# Patient Record
Sex: FEMALE | Race: WHITE | NOT HISPANIC OR LATINO | Employment: PART TIME | URBAN - METROPOLITAN AREA
[De-identification: names, ages, dates, MRNs, and addresses within clinical notes are randomized per-mention and may not be internally consistent; named-entity substitution may affect disease eponyms.]

---

## 2017-01-19 ENCOUNTER — COMMUNICATION - HEALTHEAST (OUTPATIENT)
Dept: FAMILY MEDICINE | Facility: CLINIC | Age: 32
End: 2017-01-19

## 2017-01-19 DIAGNOSIS — J45.30 MILD PERSISTENT ASTHMA, UNCOMPLICATED: ICD-10-CM

## 2017-02-23 ENCOUNTER — COMMUNICATION - HEALTHEAST (OUTPATIENT)
Dept: FAMILY MEDICINE | Facility: CLINIC | Age: 32
End: 2017-02-23

## 2017-03-06 ENCOUNTER — OFFICE VISIT - HEALTHEAST (OUTPATIENT)
Dept: FAMILY MEDICINE | Facility: CLINIC | Age: 32
End: 2017-03-06

## 2017-03-06 DIAGNOSIS — Z30.9 CONTRACEPTIVE MANAGEMENT: ICD-10-CM

## 2017-03-06 DIAGNOSIS — F79 UNSPECIFIED INTELLECTUAL DISABILITIES: ICD-10-CM

## 2017-03-06 DIAGNOSIS — J45.30 MILD PERSISTENT ASTHMA, UNCOMPLICATED: ICD-10-CM

## 2017-03-06 ASSESSMENT — MIFFLIN-ST. JEOR: SCORE: 1506.47

## 2017-06-08 ENCOUNTER — AMBULATORY - HEALTHEAST (OUTPATIENT)
Dept: NURSING | Facility: CLINIC | Age: 32
End: 2017-06-08

## 2017-09-05 ENCOUNTER — AMBULATORY - HEALTHEAST (OUTPATIENT)
Dept: NURSING | Facility: CLINIC | Age: 32
End: 2017-09-05

## 2017-10-15 ENCOUNTER — COMMUNICATION - HEALTHEAST (OUTPATIENT)
Dept: FAMILY MEDICINE | Facility: CLINIC | Age: 32
End: 2017-10-15

## 2017-10-15 DIAGNOSIS — J45.30 MILD PERSISTENT ASTHMA, UNCOMPLICATED: ICD-10-CM

## 2017-11-17 ENCOUNTER — AMBULATORY - HEALTHEAST (OUTPATIENT)
Dept: FAMILY MEDICINE | Facility: CLINIC | Age: 32
End: 2017-11-17

## 2017-11-17 DIAGNOSIS — Z00.00 HEALTH CARE MAINTENANCE: ICD-10-CM

## 2017-11-17 DIAGNOSIS — Z23 NEED FOR VACCINATION: ICD-10-CM

## 2017-12-05 ENCOUNTER — AMBULATORY - HEALTHEAST (OUTPATIENT)
Dept: NURSING | Facility: CLINIC | Age: 32
End: 2017-12-05

## 2018-02-02 ENCOUNTER — AMBULATORY - HEALTHEAST (OUTPATIENT)
Dept: FAMILY MEDICINE | Facility: CLINIC | Age: 33
End: 2018-02-02

## 2018-02-26 ENCOUNTER — AMBULATORY - HEALTHEAST (OUTPATIENT)
Dept: NURSING | Facility: CLINIC | Age: 33
End: 2018-02-26

## 2018-02-26 DIAGNOSIS — Z30.9 CONTRACEPTIVE MANAGEMENT: ICD-10-CM

## 2018-03-20 ENCOUNTER — COMMUNICATION - HEALTHEAST (OUTPATIENT)
Dept: FAMILY MEDICINE | Facility: CLINIC | Age: 33
End: 2018-03-20

## 2018-03-20 DIAGNOSIS — J45.30 MILD PERSISTENT ASTHMA, UNCOMPLICATED: ICD-10-CM

## 2018-04-12 ENCOUNTER — COMMUNICATION - HEALTHEAST (OUTPATIENT)
Dept: FAMILY MEDICINE | Facility: CLINIC | Age: 33
End: 2018-04-12

## 2018-04-12 DIAGNOSIS — J45.30 MILD PERSISTENT ASTHMA, UNCOMPLICATED: ICD-10-CM

## 2018-04-12 RX ORDER — BUDESONIDE 0.25 MG/2ML
INHALANT ORAL
Qty: 60 ML | Refills: 5 | Status: SHIPPED | OUTPATIENT
Start: 2018-04-12 | End: 2022-08-19

## 2018-05-14 ENCOUNTER — AMBULATORY - HEALTHEAST (OUTPATIENT)
Dept: FAMILY MEDICINE | Facility: CLINIC | Age: 33
End: 2018-05-14

## 2018-05-14 ENCOUNTER — AMBULATORY - HEALTHEAST (OUTPATIENT)
Dept: NURSING | Facility: CLINIC | Age: 33
End: 2018-05-14

## 2018-05-14 DIAGNOSIS — Z30.42 ENCOUNTER FOR SURVEILLANCE OF INJECTABLE CONTRACEPTIVE: ICD-10-CM

## 2018-06-10 ENCOUNTER — COMMUNICATION - HEALTHEAST (OUTPATIENT)
Dept: FAMILY MEDICINE | Facility: CLINIC | Age: 33
End: 2018-06-10

## 2018-06-10 DIAGNOSIS — J45.30 MILD PERSISTENT ASTHMA, UNCOMPLICATED: ICD-10-CM

## 2018-08-06 ENCOUNTER — OFFICE VISIT - HEALTHEAST (OUTPATIENT)
Dept: FAMILY MEDICINE | Facility: CLINIC | Age: 33
End: 2018-08-06

## 2018-08-06 DIAGNOSIS — J45.30 MILD PERSISTENT ASTHMA, UNCOMPLICATED: ICD-10-CM

## 2018-08-06 DIAGNOSIS — Z00.00 ROUTINE GENERAL MEDICAL EXAMINATION AT A HEALTH CARE FACILITY: ICD-10-CM

## 2018-08-06 DIAGNOSIS — Z30.42 ENCOUNTER FOR SURVEILLANCE OF INJECTABLE CONTRACEPTIVE: ICD-10-CM

## 2018-08-06 ASSESSMENT — MIFFLIN-ST. JEOR: SCORE: 1594.24

## 2018-09-08 ENCOUNTER — COMMUNICATION - HEALTHEAST (OUTPATIENT)
Dept: FAMILY MEDICINE | Facility: CLINIC | Age: 33
End: 2018-09-08

## 2018-09-08 DIAGNOSIS — J45.30 MILD PERSISTENT ASTHMA, UNCOMPLICATED: ICD-10-CM

## 2018-10-29 ENCOUNTER — AMBULATORY - HEALTHEAST (OUTPATIENT)
Dept: NURSING | Facility: CLINIC | Age: 33
End: 2018-10-29

## 2018-10-29 DIAGNOSIS — Z30.9 CONTRACEPTIVE MANAGEMENT: ICD-10-CM

## 2019-01-17 ENCOUNTER — COMMUNICATION - HEALTHEAST (OUTPATIENT)
Dept: FAMILY MEDICINE | Facility: CLINIC | Age: 34
End: 2019-01-17

## 2019-01-17 DIAGNOSIS — J45.30 MILD PERSISTENT ASTHMA, UNCOMPLICATED: ICD-10-CM

## 2019-01-21 ENCOUNTER — AMBULATORY - HEALTHEAST (OUTPATIENT)
Dept: NURSING | Facility: CLINIC | Age: 34
End: 2019-01-21

## 2019-04-08 ENCOUNTER — AMBULATORY - HEALTHEAST (OUTPATIENT)
Dept: NURSING | Facility: CLINIC | Age: 34
End: 2019-04-08

## 2019-04-19 ENCOUNTER — COMMUNICATION - HEALTHEAST (OUTPATIENT)
Dept: FAMILY MEDICINE | Facility: CLINIC | Age: 34
End: 2019-04-19

## 2019-04-19 DIAGNOSIS — J45.30 MILD PERSISTENT ASTHMA, UNCOMPLICATED: ICD-10-CM

## 2019-04-22 RX ORDER — ALBUTEROL SULFATE 90 UG/1
AEROSOL, METERED RESPIRATORY (INHALATION)
Qty: 18 G | Refills: 0 | Status: SHIPPED | OUTPATIENT
Start: 2019-04-22 | End: 2021-09-28

## 2019-07-08 ENCOUNTER — AMBULATORY - HEALTHEAST (OUTPATIENT)
Dept: NURSING | Facility: CLINIC | Age: 34
End: 2019-07-08

## 2019-07-08 ENCOUNTER — AMBULATORY - HEALTHEAST (OUTPATIENT)
Dept: FAMILY MEDICINE | Facility: CLINIC | Age: 34
End: 2019-07-08

## 2019-07-08 DIAGNOSIS — Z30.42 ENCOUNTER FOR SURVEILLANCE OF INJECTABLE CONTRACEPTIVE: ICD-10-CM

## 2019-08-20 ENCOUNTER — OFFICE VISIT - HEALTHEAST (OUTPATIENT)
Dept: FAMILY MEDICINE | Facility: CLINIC | Age: 34
End: 2019-08-20

## 2019-08-20 DIAGNOSIS — Z11.3 SCREEN FOR STD (SEXUALLY TRANSMITTED DISEASE): ICD-10-CM

## 2019-08-20 DIAGNOSIS — Z12.4 SCREENING FOR MALIGNANT NEOPLASM OF CERVIX: ICD-10-CM

## 2019-08-20 DIAGNOSIS — J45.30 MILD PERSISTENT ASTHMA, UNCOMPLICATED: ICD-10-CM

## 2019-08-20 DIAGNOSIS — Z00.00 ROUTINE GENERAL MEDICAL EXAMINATION AT A HEALTH CARE FACILITY: ICD-10-CM

## 2019-08-20 DIAGNOSIS — N89.8 VAGINAL ODOR: ICD-10-CM

## 2019-08-20 LAB
CLUE CELLS: NORMAL
TRICHOMONAS, WET PREP: NORMAL
YEAST, WET PREP: NORMAL

## 2019-08-20 ASSESSMENT — MIFFLIN-ST. JEOR: SCORE: 1604.67

## 2019-08-21 LAB
C TRACH DNA SPEC QL PROBE+SIG AMP: NEGATIVE
HPV SOURCE: ABNORMAL
HUMAN PAPILLOMA VIRUS 16 DNA: NEGATIVE
HUMAN PAPILLOMA VIRUS 18 DNA: NEGATIVE
HUMAN PAPILLOMA VIRUS FINAL DIAGNOSIS: ABNORMAL
HUMAN PAPILLOMA VIRUS OTHER HR: POSITIVE
N GONORRHOEA DNA SPEC QL NAA+PROBE: NEGATIVE
SPECIMEN DESCRIPTION: ABNORMAL

## 2019-08-26 ENCOUNTER — OFFICE VISIT - HEALTHEAST (OUTPATIENT)
Dept: MIDWIFE SERVICES | Facility: CLINIC | Age: 34
End: 2019-08-26

## 2019-08-26 DIAGNOSIS — Z30.017 NEXPLANON INSERTION: ICD-10-CM

## 2019-08-26 LAB — HCG UR QL: NEGATIVE

## 2019-08-26 ASSESSMENT — MIFFLIN-ST. JEOR: SCORE: 1618.28

## 2019-08-27 ENCOUNTER — COMMUNICATION - HEALTHEAST (OUTPATIENT)
Dept: FAMILY MEDICINE | Facility: CLINIC | Age: 34
End: 2019-08-27

## 2019-08-28 LAB
BKR LAB AP ABNORMAL BLEEDING: NO
BKR LAB AP BIRTH CONTROL/HORMONES: ABNORMAL
BKR LAB AP CERVICAL APPEARANCE: NORMAL
BKR LAB AP GYN ADEQUACY: ABNORMAL
BKR LAB AP GYN INTERPRETATION: ABNORMAL
BKR LAB AP HPV REFLEX: ABNORMAL
BKR LAB AP LMP: ABNORMAL
BKR LAB AP PATIENT STATUS: ABNORMAL
BKR LAB AP PREVIOUS ABNORMAL: NO
BKR LAB AP PREVIOUS NORMAL: 2016
HIGH RISK?: NO
INTERPRETING LAB: ABNORMAL
PATH REPORT.COMMENTS IMP SPEC: ABNORMAL
RESULT FLAG (HE HISTORICAL CONVERSION): ABNORMAL

## 2019-08-30 ENCOUNTER — COMMUNICATION - HEALTHEAST (OUTPATIENT)
Dept: FAMILY MEDICINE | Facility: CLINIC | Age: 34
End: 2019-08-30

## 2019-09-30 ENCOUNTER — AMBULATORY - HEALTHEAST (OUTPATIENT)
Dept: FAMILY MEDICINE | Facility: CLINIC | Age: 34
End: 2019-09-30

## 2019-09-30 DIAGNOSIS — R87.610 ATYPICAL SQUAMOUS CELLS OF UNDETERMINED SIGNIFICANCE ON CYTOLOGIC SMEAR OF CERVIX (ASC-US): ICD-10-CM

## 2019-09-30 LAB — HCG UR QL: NEGATIVE

## 2019-09-30 ASSESSMENT — MIFFLIN-ST. JEOR: SCORE: 1618.28

## 2019-10-02 LAB
LAB AP CHARGES (HE HISTORICAL CONVERSION): NORMAL
PATH REPORT.COMMENTS IMP SPEC: NORMAL
PATH REPORT.COMMENTS IMP SPEC: NORMAL
PATH REPORT.FINAL DX SPEC: NORMAL
PATH REPORT.GROSS SPEC: NORMAL
PATH REPORT.MICROSCOPIC SPEC OTHER STN: NORMAL
PATH REPORT.RELEVANT HX SPEC: NORMAL
RESULT FLAG (HE HISTORICAL CONVERSION): NORMAL

## 2020-10-01 ENCOUNTER — OFFICE VISIT - HEALTHEAST (OUTPATIENT)
Dept: FAMILY MEDICINE | Facility: CLINIC | Age: 35
End: 2020-10-01

## 2020-10-01 ENCOUNTER — COMMUNICATION - HEALTHEAST (OUTPATIENT)
Dept: FAMILY MEDICINE | Facility: CLINIC | Age: 35
End: 2020-10-01

## 2020-10-01 DIAGNOSIS — Z30.017 ENCOUNTER FOR INITIAL PRESCRIPTION OF IMPLANTABLE SUBDERMAL CONTRACEPTIVE: ICD-10-CM

## 2020-10-01 DIAGNOSIS — J30.2 SEASONAL ALLERGIC RHINITIS, UNSPECIFIED TRIGGER: ICD-10-CM

## 2020-10-01 DIAGNOSIS — R87.610 ATYPICAL SQUAMOUS CELLS OF UNDETERMINED SIGNIFICANCE ON CYTOLOGIC SMEAR OF CERVIX (ASC-US): ICD-10-CM

## 2020-10-01 DIAGNOSIS — Z12.4 SCREENING FOR MALIGNANT NEOPLASM OF CERVIX: ICD-10-CM

## 2020-10-01 DIAGNOSIS — Z00.00 ANNUAL PHYSICAL EXAM: ICD-10-CM

## 2020-10-01 DIAGNOSIS — Z23 NEED FOR INFLUENZA VACCINATION: ICD-10-CM

## 2020-10-01 DIAGNOSIS — J45.30 MILD PERSISTENT ASTHMA, UNCOMPLICATED: ICD-10-CM

## 2020-10-01 DIAGNOSIS — F41.1 ANXIETY STATE: ICD-10-CM

## 2020-10-01 RX ORDER — DIPHENHYDRAMINE HCL 25 MG
25 CAPSULE ORAL EVERY 4 HOURS PRN
Qty: 30 CAPSULE | Refills: 2 | Status: SHIPPED
Start: 2020-10-01 | End: 2021-12-27

## 2020-10-01 RX ORDER — LORATADINE 10 MG/1
10 TABLET ORAL DAILY
Qty: 30 TABLET | Refills: 2 | Status: SHIPPED
Start: 2020-10-01 | End: 2021-12-27

## 2020-10-01 ASSESSMENT — ANXIETY QUESTIONNAIRES
2. NOT BEING ABLE TO STOP OR CONTROL WORRYING: NOT AT ALL
4. TROUBLE RELAXING: MORE THAN HALF THE DAYS
2. NOT BEING ABLE TO STOP OR CONTROL WORRYING: SEVERAL DAYS
1. FEELING NERVOUS, ANXIOUS, OR ON EDGE: SEVERAL DAYS
6. BECOMING EASILY ANNOYED OR IRRITABLE: NOT AT ALL
7. FEELING AFRAID AS IF SOMETHING AWFUL MIGHT HAPPEN: NOT AT ALL
1. FEELING NERVOUS, ANXIOUS, OR ON EDGE: MORE THAN HALF THE DAYS
GAD7 TOTAL SCORE: 6
3. WORRYING TOO MUCH ABOUT DIFFERENT THINGS: SEVERAL DAYS
5. BEING SO RESTLESS THAT IT IS HARD TO SIT STILL: SEVERAL DAYS

## 2020-10-01 ASSESSMENT — MIFFLIN-ST. JEOR: SCORE: 1655.42

## 2020-10-02 LAB
HPV SOURCE: ABNORMAL
HUMAN PAPILLOMA VIRUS 16 DNA: NEGATIVE
HUMAN PAPILLOMA VIRUS 18 DNA: NEGATIVE
HUMAN PAPILLOMA VIRUS FINAL DIAGNOSIS: ABNORMAL
HUMAN PAPILLOMA VIRUS OTHER HR: POSITIVE
SPECIMEN DESCRIPTION: ABNORMAL

## 2020-10-09 LAB

## 2020-10-19 ENCOUNTER — COMMUNICATION - HEALTHEAST (OUTPATIENT)
Dept: FAMILY MEDICINE | Facility: CLINIC | Age: 35
End: 2020-10-19

## 2020-10-19 DIAGNOSIS — R87.610 ASCUS WITH POSITIVE HIGH RISK HPV CERVICAL: ICD-10-CM

## 2020-10-19 DIAGNOSIS — R87.810 ASCUS WITH POSITIVE HIGH RISK HPV CERVICAL: ICD-10-CM

## 2020-10-22 ENCOUNTER — COMMUNICATION - HEALTHEAST (OUTPATIENT)
Dept: FAMILY MEDICINE | Facility: CLINIC | Age: 35
End: 2020-10-22

## 2020-11-12 ENCOUNTER — OFFICE VISIT - HEALTHEAST (OUTPATIENT)
Dept: FAMILY MEDICINE | Facility: CLINIC | Age: 35
End: 2020-11-12

## 2020-11-12 DIAGNOSIS — Z97.5 NEXPLANON IN PLACE: ICD-10-CM

## 2020-11-12 DIAGNOSIS — R87.610 ASCUS WITH POSITIVE HIGH RISK HPV CERVICAL: ICD-10-CM

## 2020-11-12 DIAGNOSIS — R87.810 ASCUS WITH POSITIVE HIGH RISK HPV CERVICAL: ICD-10-CM

## 2020-11-12 DIAGNOSIS — F41.1 ANXIETY STATE: ICD-10-CM

## 2020-11-12 DIAGNOSIS — J45.30 MILD PERSISTENT ASTHMA, UNCOMPLICATED: ICD-10-CM

## 2020-11-12 RX ORDER — FLUOXETINE 10 MG/1
10 CAPSULE ORAL DAILY
Qty: 30 CAPSULE | Refills: 11 | Status: SHIPPED | OUTPATIENT
Start: 2020-11-12 | End: 2021-12-27

## 2020-11-12 RX ORDER — ETONOGESTREL 68 MG/1
IMPLANT SUBCUTANEOUS
Refills: 0 | Status: SHIPPED
Start: 2020-11-12

## 2020-11-12 ASSESSMENT — ANXIETY QUESTIONNAIRES
3. WORRYING TOO MUCH ABOUT DIFFERENT THINGS: SEVERAL DAYS
7. FEELING AFRAID AS IF SOMETHING AWFUL MIGHT HAPPEN: NOT AT ALL
4. TROUBLE RELAXING: SEVERAL DAYS
GAD7 TOTAL SCORE: 3
IF YOU CHECKED OFF ANY PROBLEMS ON THIS QUESTIONNAIRE, HOW DIFFICULT HAVE THESE PROBLEMS MADE IT FOR YOU TO DO YOUR WORK, TAKE CARE OF THINGS AT HOME, OR GET ALONG WITH OTHER PEOPLE: NOT DIFFICULT AT ALL
2. NOT BEING ABLE TO STOP OR CONTROL WORRYING: NOT AT ALL
5. BEING SO RESTLESS THAT IT IS HARD TO SIT STILL: SEVERAL DAYS
6. BECOMING EASILY ANNOYED OR IRRITABLE: NOT AT ALL
1. FEELING NERVOUS, ANXIOUS, OR ON EDGE: NOT AT ALL

## 2021-03-16 ENCOUNTER — AMBULATORY - HEALTHEAST (OUTPATIENT)
Dept: NURSING | Facility: CLINIC | Age: 36
End: 2021-03-16

## 2021-04-06 ENCOUNTER — AMBULATORY - HEALTHEAST (OUTPATIENT)
Dept: NURSING | Facility: CLINIC | Age: 36
End: 2021-04-06

## 2021-05-13 ENCOUNTER — OFFICE VISIT - HEALTHEAST (OUTPATIENT)
Dept: FAMILY MEDICINE | Facility: CLINIC | Age: 36
End: 2021-05-13

## 2021-05-13 DIAGNOSIS — J45.30 MILD PERSISTENT ASTHMA, UNCOMPLICATED: ICD-10-CM

## 2021-05-13 DIAGNOSIS — F41.1 ANXIETY STATE: ICD-10-CM

## 2021-05-13 DIAGNOSIS — Z59.71 INSURANCE COVERAGE PROBLEMS: ICD-10-CM

## 2021-05-13 ASSESSMENT — ANXIETY QUESTIONNAIRES
1. FEELING NERVOUS, ANXIOUS, OR ON EDGE: NOT AT ALL
7. FEELING AFRAID AS IF SOMETHING AWFUL MIGHT HAPPEN: NOT AT ALL
3. WORRYING TOO MUCH ABOUT DIFFERENT THINGS: NOT AT ALL
4. TROUBLE RELAXING: NOT AT ALL
5. BEING SO RESTLESS THAT IT IS HARD TO SIT STILL: NOT AT ALL
GAD7 TOTAL SCORE: 0
2. NOT BEING ABLE TO STOP OR CONTROL WORRYING: NOT AT ALL
6. BECOMING EASILY ANNOYED OR IRRITABLE: NOT AT ALL

## 2021-05-14 ENCOUNTER — COMMUNICATION - HEALTHEAST (OUTPATIENT)
Dept: NURSING | Facility: CLINIC | Age: 36
End: 2021-05-14

## 2021-05-28 ASSESSMENT — ASTHMA QUESTIONNAIRES: ACT_TOTALSCORE: 22

## 2021-05-28 ASSESSMENT — ANXIETY QUESTIONNAIRES
GAD7 TOTAL SCORE: 0
GAD7 TOTAL SCORE: 6
GAD7 TOTAL SCORE: 3

## 2021-05-28 NOTE — TELEPHONE ENCOUNTER
Refill Approved    Rx renewed per Medication Renewal Policy. Medication was last renewed on 1/18/19.    Ernsetina Paige, [facility] Triage/Med Refill 4/22/2019     Requested Prescriptions   Pending Prescriptions Disp Refills     albuterol (PROAIR HFA;PROVENTIL HFA;VENTOLIN HFA) 90 mcg/actuation inhaler [Pharmacy Med Name: ALBUTEROL HFA INH (200 PUFFS) 18GM] 18 g 0     Sig: INHALE 2 PUFFS BY MOUTH FOUR TIMES DAILY AS NEEDED FOR WHEEZING WITH SPACER       Albuterol/Levalbuterol Refill Protocol Passed - 4/19/2019  9:09 AM        Passed - PCP or prescribing provider visit in last year     Last office visit with prescriber/PCP: 3/6/2017 Sharon Zavala CNP OR same dept: Visit date not found OR same specialty: 3/6/2017 Sharon Zavala CNP Last physical: 7/28/2016       Next appt within 3 mo: Visit date not found  Next physical within 3 mo: Visit date not found  Prescriber OR PCP: Sharon Zavala CNP  Last diagnosis associated with med order: 1. Mild persistent asthma, uncomplicated  - albuterol (PROAIR HFA;PROVENTIL HFA;VENTOLIN HFA) 90 mcg/actuation inhaler [Pharmacy Med Name: ALBUTEROL HFA INH (200 PUFFS) 18GM]; INHALE 2 PUFFS BY MOUTH FOUR TIMES DAILY AS NEEDED FOR WHEEZING WITH SPACER  Dispense: 18 g; Refill: 0    If protocol passes may refill for 6 months if within 3 months of last provider visit (or a total of 9 months). If patient requesting >1 inhaler per month refill x 6 months and have patient make appointment with provider.

## 2021-05-30 VITALS — WEIGHT: 173 LBS | HEIGHT: 66 IN | BODY MASS INDEX: 27.8 KG/M2

## 2021-05-31 NOTE — PROGRESS NOTES
Assessment and Plan:     1. Routine general medical examination at a health care facility  Healthy female exam completed today. Discussed lifestyle modifications including weight loss, eating a balanced diet and getting regular cardiovascular exercise. Discussed self breast exams and how to complete these. Pap Smear updated today. Plan yearly annual physicals or sooner as needed.     2. Screening for malignant neoplasm of cervix  Pap smear updated today on patient.  - Gynecologic Cytology (PAP Smear)    3. Vaginal odor  Increased vaginal odor was present.  Wet prep was collected and was negative.  - Wet Prep, Vaginal    4. Screen for STD (sexually transmitted disease)  We will screen for STDs today.  - Chlamydia trachomatis & Neisseria gonorrhoeae, Amplified Detection    5. Mild persistent asthma, uncomplicated  Asthma is well controlled ACT score today is 24.      The patient's current medical problems were reviewed.      The following health maintenance schedule was reviewed with the patient and provided in printed form in the after visit summary:   Health Maintenance   Topic Date Due     HIV SCREENING  07/18/2002     INFLUENZA VACCINE RULE BASED (1) 08/01/2019     ASTHMA CONTROL TEST  08/06/2019     MEDICARE ANNUAL WELLNESS VISIT  08/06/2019     ADVANCE CARE PLANNING  02/01/2021     PAP SMEAR  07/28/2021     TD 18+ HE  12/05/2027     TDAP ADULT ONE TIME DOSE  Completed        Subjective:   Chief Complaint: Sharon Moraes is an 34 y.o. female here for an Annual Wellness visit.   HPI: Overall doing well.  She is due to have a Pap smear this year.  She has been doing well on Depo-Provera but has had a 32 pound weight gain since starting this type of birth control.  She is wondering if there is another type of birth control that she could be on that would help reduce the risk of weight gain or help her lose weight.  She is not currently sexually active but has a new boyfriend.  She reports that she is safe in her  home and feels well.  Her asthma has been under good control.  She has not had any asthma exacerbations.  She continues to work at [employer] 5 days a week and enjoys this job.    Review of Systems: She denies any chest pain, shortness of breath, or difficulty breathing.  Please see above.  The rest of the review of systems are negative for all systems.    Patient Care Team:  Sharon Zavala, CNP as PCP - General (Family Medicine)  Anabelle Santamaria, Rep Irwin as      Patient Active Problem List   Diagnosis     Myopia     Amblyopia     Anxiety     Attention deficit hyperactivity disorder (ADHD)     Borderline Intellectual Functioning     Asthma     Recent Weight Gain (___ Lbs)     Allergy To Dust     Mild persistent asthma, uncomplicated     Contraceptive management     Past Medical History:   Diagnosis Date     ADHD (attention deficit hyperactivity disorder)      Anxiety      Asthma      Borderline intellectual functioning       Past Surgical History:   Procedure Laterality Date     WISDOM TOOTH EXTRACTION        Family History   Problem Relation Age of Onset     Diabetes Mother      Cancer Neg Hx      Stroke Neg Hx       Social History     Socioeconomic History     Marital status: Single     Spouse name: Not on file     Number of children: Not on file     Years of education: Not on file     Highest education level: Not on file   Occupational History     Not on file   Social Needs     Financial resource strain: Not on file     Food insecurity:     Worry: Not on file     Inability: Not on file     Transportation needs:     Medical: Not on file     Non-medical: Not on file   Tobacco Use     Smoking status: Never Smoker     Smokeless tobacco: Never Used   Substance and Sexual Activity     Alcohol use: No     Drug use: No     Sexual activity: Yes     Partners: Male   Lifestyle     Physical activity:     Days per week: Not on file     Minutes per session: Not on file     Stress: Not on file  "  Relationships     Social connections:     Talks on phone: Not on file     Gets together: Not on file     Attends Christian service: Not on file     Active member of club or organization: Not on file     Attends meetings of clubs or organizations: Not on file     Relationship status: Not on file     Intimate partner violence:     Fear of current or ex partner: Not on file     Emotionally abused: Not on file     Physically abused: Not on file     Forced sexual activity: Not on file   Other Topics Concern     Not on file   Social History Narrative     Not on file      Current Outpatient Medications   Medication Sig Dispense Refill     albuterol (PROAIR HFA;PROVENTIL HFA;VENTOLIN HFA) 90 mcg/actuation inhaler INHALE 2 PUFFS BY MOUTH FOUR TIMES DAILY AS NEEDED FOR WHEEZING WITH SPACER 18 g 0     albuterol (PROVENTIL) 2.5 mg /3 mL (0.083 %) nebulizer solution USE 1 VIAL VIA NEBULIZER DAILY(MIX WITH BUDESONIDE) 180 mL 0     budesonide (PULMICORT) 0.25 mg/2 mL nebulizer solution INHALE 1 VIAL VIA NEBULIZER TWICE DAILY. MIX WITH ALBUTEROL 60 mL 5     budesonide (PULMICORT) 90 mcg/actuation inhaler Inhale 1 puff 2 (two) times a day. 1 Inhaler 6     montelukast (SINGULAIR) 10 mg tablet TAKE 1 TABLET(10 MG) BY MOUTH AT BEDTIME 90 tablet 3     No current facility-administered medications for this visit.       Objective:   Vital Signs:   Visit Vitals  /70   Pulse 88   Resp 16   Ht 5' 5.9\" (1.674 m)   Wt 195 lb (88.5 kg)   BMI 31.57 kg/m         VisionScreening:  No exam data present     PHYSICAL EXAM  /70   Pulse 88   Resp 16   Ht 5' 5.9\" (1.674 m)   Wt 195 lb (88.5 kg)   BMI 31.57 kg/m      General Appearance:    Alert, cooperative, no distress, appears stated age   Head:    Normocephalic, without obvious abnormality, atraumatic   Eyes:    PERRL, conjunctiva/corneas clear, EOM's intact, fundi     benign, both eyes   Ears:    Normal TM's and external ear canals, both ears   Nose:   Nares normal, septum midline, " mucosa normal, no drainage     or sinus tenderness   Throat:   Lips, mucosa, and tongue normal; teeth and gums normal   Neck:   Supple, symmetrical, trachea midline, no adenopathy;     thyroid:  no enlargement/tenderness/nodules; no carotid    bruit or JVD   Back:     Symmetric, no curvature, ROM normal, no CVA tenderness   Lungs:     Clear to auscultation bilaterally, respirations unlabored   Chest Wall:    No tenderness or deformity    Heart:    Regular rate and rhythm, S1 and S2 normal, no murmur, rub    or gallop   Breast Exam:    No tenderness, masses, or nipple abnormality   Abdomen:     Soft, non-tender, bowel sounds active all four quadrants,     no masses, no organomegaly   Genitalia:    Normal female without lesion, discharge or tenderness.  Uterus is nontender.  Thin white vaginal discharge is present.  No adnexal mass or tenderness.   Extremities:   Extremities normal, atraumatic, no cyanosis or edema   Pulses:   2+ and symmetric all extremities   Skin:   Skin color, texture, turgor normal, no rashes or lesions   Lymph nodes:   Cervical, supraclavicular, and axillary nodes normal   Neurologic:   CNII-XII intact, normal strength, sensation and reflexes     throughout         Assessment Results 8/20/2019   Activities of Daily Living No help needed   Instrumental Activities of Daily Living No help needed   Mini Cog Total Score 5   Some recent data might be hidden     A Mini-Cog score of 0-2 suggests the possibility of dementia, score of 3-5 suggests no dementia    Identified Health Risks:     She is at risk for lack of exercise and has been provided with information to increase physical activity for the benefit of her well-being.  The patient was counseled and encouraged to consider modifying their diet and eating habits. She was provided with information on recommended healthy diet options.  Information regarding advance directives ([place]), including where she can download the appropriate form, was  provided to the patient via the AVS.

## 2021-05-31 NOTE — TELEPHONE ENCOUNTER
Called patient with pap results. Positive HPV and ASCUS. Recommend Colposcopy. Discussed this with patient and she is comfortable with this plan. I will have our specialty  give her a call to assist with scheduling this.     She works 10:30-3pm and M-F and does not wish to miss work.

## 2021-05-31 NOTE — PROGRESS NOTES
Sharon is a 33 yo G0 here today for a Nexplanon insertion.   Sharon is  Not currently sexually active, last active 1 year ago. Recently had a negative GC/CT screen    Sharon has been on Depo for the last three years and is concerned that it is contributing to her weight gain. Denies changing her diet or activity level. She does not like coming in every 3 months for an injection. She understands that the Nexplanon can cause light unpredictable spotting. She would like to proceed with Nexplanon insertion today.  She is due for her next injection 9/23/19-10/07/19. She understands there will be a few weeks where she will have both Depo and Nexplanon. She is interested in Nexplanon and requests to have it placed today.    Nexplanon Insertion Procedure Note    Pre-operative Diagnosis: desires Nexplanon for contraception    Post-operative Diagnosis: Nexplanon in situ    Indications: contraception    Procedure Details   Urine pregnancy test was done and result was negative.     NEXPLANON has been discussed with healthcare provider who answered all   questions. It was explained that there are benefits as well as risks with using NEXPLANON. The patient understands that there are other birth control methods and that each has its own benefits and risks.  She also understands that she needs to sign a consent form to show  that she is making an  informed and careful decision to use NEXPLANON, and that she has read and understood the following points.    NEXPLANON helps to keep her from getting pregnant.    No contraceptive method is 100% effective, including NEXPLANON.    NEXPLANON has an implant that contains a hormone.    It is important to have the NEXPLANON implant placed in the arm at the right time ofthe menstrual cycle.    After the implant is placed in my arm, the patient should check that it is in place by gently pressing her fingertips over the skin where the implant was placed. She should be able to feel the implant.    The  implant must be removed at the end of three years. The implant can be removed sooner if  she wants it removed.    If she has trouble finding a healthcare provider to remove the implant, she can call [phone] for help.    The implant is placed under the skin of the arm during a procedure done in a healthcare provider s office. There is a slight risk of getting a scar or an infection from this procedure.    Removal is usually a minor procedure. Sometimes, removal may be more difficult. Special procedures, including surgery in the hospital, may be needed. Difficult removals may cause pain and scarring and may result in injury to nerves and blood vessels. If the implant is not removed, its effects may continue.    Most women have changes in their menstrual bleeding patterns while using NEXPLANON.  Bleeding may be irregular, lighter or heavier, or bleeding may completely stop. If the patient thinks she is pregnant, she should contact my healthcare provider as soon as possible.    the patient verbalizes her understanding of  the warning signs for problems with NEXPLANON. She has been advised to seek medical attention if any warning signs appear.    She should tell all her healthcare providers that she is using NEXPLANON.    The patient was advised to have a medical checkup regularly and at any time she is having problems.    NEXPLANON does not provide protection from HIV infection (AIDS) or any other sexually transmitted diseases.  After learning about NEXPLANON, the patient chose to use NEXPLANON.     The risks (including infection, bleeding, pain) and benefits of the procedure were explained to the patient and Written informed consent was obtained.      Pt was positioned on exam table with left, non dominant arm next to her on the table. Insertion site was cleaned with alcohol prep at 7cm from elbow crease. 1%Lidocaine inserted intradermally and Nexplanon was inserted without difficulty. Site was covered with band  aid and a pressure dressing. Pt instructed not to lift heavy items with left arm for 24 hours, Ibuprofen prn for pain or ice to site as need for bruising    Nexplanon Information:  Lot # [id number]  Exp date: 11/25/2021    Condition:  Stable    Complications:  None    Plan:    The patient was advised to call for any fever or for prolonged or severe pain or bleeding. She was advised to use OTC ibuprofen as needed for mild to moderate pain.

## 2021-05-31 NOTE — TELEPHONE ENCOUNTER
There wasn't a time that worked for patient with Dr. Gallardo so I scheduled with Dr Allred for 9/30

## 2021-06-01 VITALS — WEIGHT: 192.7 LBS | BODY MASS INDEX: 30.97 KG/M2 | HEIGHT: 66 IN

## 2021-06-01 NOTE — PROGRESS NOTES
"Colposcopy  Date/Time: 9/30/2019 1:19 PM  Performed by: Silvana Allred MD  Authorized by: Silvana Allred MD   Consent: Verbal consent obtained. Written consent obtained.  Risks and benefits: risks, benefits and alternatives were discussed  Consent given by: patient  Patient understanding: patient states understanding of the procedure being performed  Patient consent: the patient's understanding of the procedure matches consent given  Procedure consent: procedure consent matches procedure scheduled  Relevant documents: relevant documents present and verified  Test results: test results available and properly labeled  Required items: required blood products, implants, devices, and special equipment available  Patient identity confirmed: verbally with patient  Time out: Immediately prior to procedure a \"time out\" was called to verify the correct patient, procedure, equipment, support staff and site/side marked as required.  Comments: After discussion of risks and benefits, negative pregnancy test was reviewed and consent form was signed.  Patient's Pap smear history is as follows:  7/28/2016: Normal Pap  8/20/2019: ASCUS, positive HPV (not 16 or 18)    Patient was prepped and draped in the usual fashion and placed in the dorsal lithotomy position.  Her cervix was well-visualized with a sterile speculum.  A mild amount of blood was wiped away with a plain cotton swab and SCJ was visualized in its entirety.  No abnormalities were seen with direct visualization through the colposcope, no abnormal vasculature became apparent with use of Miller filter.  Upon application of acetic acid, there was mild acetowhite change visualized at 6:00 in relation to the cervical os.  With application of Lugol's solution, this area was mildly negatively staining.  A biopsy was taken of this area.  Patient will be informed of the biopsy results when they return and this will determine future Pap smear frequency.  Patient " tolerated the procedure well and there were no immediate complications.  A small amount of bleeding from the biopsy site was stopped with Monsel solution.    Results for orders placed or performed in visit on 09/30/19  -Pregnancy (Beta-hCG, Qual), Urine       Result                      Value             Ref Range           Pregnancy Test, Urine       Negative          Negative

## 2021-06-03 VITALS
SYSTOLIC BLOOD PRESSURE: 116 MMHG | WEIGHT: 198 LBS | RESPIRATION RATE: 20 BRPM | HEART RATE: 76 BPM | BODY MASS INDEX: 31.82 KG/M2 | DIASTOLIC BLOOD PRESSURE: 74 MMHG | HEIGHT: 66 IN

## 2021-06-03 VITALS — HEIGHT: 66 IN | BODY MASS INDEX: 31.34 KG/M2 | WEIGHT: 195 LBS

## 2021-06-03 VITALS — WEIGHT: 198 LBS | BODY MASS INDEX: 31.82 KG/M2 | HEIGHT: 66 IN

## 2021-06-05 VITALS
HEART RATE: 80 BPM | RESPIRATION RATE: 20 BRPM | SYSTOLIC BLOOD PRESSURE: 110 MMHG | HEIGHT: 66 IN | WEIGHT: 206.19 LBS | BODY MASS INDEX: 33.14 KG/M2 | TEMPERATURE: 98.5 F | DIASTOLIC BLOOD PRESSURE: 74 MMHG

## 2021-06-09 NOTE — PROGRESS NOTES
Assessment/Plan:         1. Contraceptive management  Pregnancy, Urine   2. Borderline Intellectual Functioning     3. Mild persistent asthma, uncomplicated  budesonide (PULMICORT) 90 mcg/actuation inhaler    montelukast (SINGULAIR) 10 mg tablet    albuterol (PROAIR HFA) 90 mcg/actuation inhaler     Patients pregnancy test was negative today. Depo Shot given. She was provided with refills of her asthma medications today as well. As she has had great control of her asthma i switched patient off of budesonide nebulizer to an inhaler. She was encouraged to not take friends medications. She was reminded to use safe sexual practices with barrier protection. She is a vulnerable adult. Overall she appears to be doing well in regards for self care. I discussed with patient to follow up with me in one year or sooner as needed. Otherwise patient to return to the clinic for Depo shot every 3 months.       Subjective:      Sharon Moraes is a 31 y.o. female who presents for contraceptive management. She also needs to complete an asthma control test. She reports that she has not been sexually active. However, she would like to remain on depo-provera for birth control as this has been working well for her. She also reports that she would like refills of her medications for her asthma. Her ACT score is 25 today. She reports that that she ran out of some of her medications so a friend gave her some of their albuterol inhalers. She feels that overall she has been doing very well with her asthma control. She reports that her largest triggers for asthma symptoms continues to be dust.     The following portions of the patient's history were reviewed and updated as appropriate: allergies, current medications and problem list.    Review of Systems   Pertinent items are noted in HPI.      Objective:        Visit Vitals     /60 (Patient Site: Left Arm, Patient Position: Sitting, Cuff Size: Adult Regular)     Pulse 66     Resp 16      "Ht 5' 6\" (1.676 m)     Wt 173 lb (78.5 kg)     BMI 27.92 kg/m2       General appearance: alert, appears stated age and cooperative  Head: Normocephalic, without obvious abnormality, atraumatic  Lungs: clear to auscultation bilaterally  Heart: regular rate and rhythm, S1, S2 normal, no murmur, click, rub or gallop  Neurologic: Grossly normal      Results for orders placed or performed in visit on 03/06/17   Pregnancy, Urine   Result Value Ref Range    Pregnancy Test, Urine Negative Negative    Specific Gravity, UA 1.025 1.001 - 1.030         "

## 2021-06-11 NOTE — PATIENT INSTRUCTIONS - HE
Advance Directive  Patient s advance directive was discussed and I am comfortable with the patient s wishes.  Patient Education   Personalized Prevention Plan  You are due for the preventive services outlined below.  Your care team is available to assist you in scheduling these services.  If you have already completed any of these items, please share that information with your care team to update in your medical record.  Health Maintenance   Topic Date Due     ASTHMA ACTION PLAN  1987     Pneumococcal Vaccine: Pediatrics (0 to 5 Years) and At-Risk Patients (6 to 64 Years) (1 of 1 - PPSV23) 07/18/1993     HIV SCREENING  07/18/2002     INFLUENZA VACCINE RULE BASED (1) 08/01/2020     Asthma Control Test  08/20/2020     MEDICARE ANNUAL WELLNESS VISIT  08/20/2020     ADVANCE CARE PLANNING  02/01/2021     PAP SMEAR  08/20/2024     HPV TEST  08/20/2024     TD 18+ HE  12/05/2027     Pneumococcal Vaccine: 65+ Years (1 of 1 - PPSV23) 07/18/2052     HEPATITIS B VACCINES  Completed     TDAP ADULT ONE TIME DOSE  Completed

## 2021-06-11 NOTE — PROGRESS NOTES
Assessment and Plan:     Annual exam  H/o ASCUS -check Pap with HPV today    Mild persistent asthma  ACT and asthma action plan completed today  Continue budesonide for controller med and albuterol as needed  She is using over-the-counter allergy medications because is cheaper than her Singulair  Ordered a new neb machine to be delivered from [employer].  Her current neb machine is over 11 years old and not working great.  She does not have a card to be able to  a neb machine from a [employer] company.  I gave her some neb tubing to use in the meantime given her current tubing is old and not working.    Anxiety  Mayito 7 was 6 today.  She wanted to start medication so we are starting low-dose fluoxetine and will have her follow-up with the virtual visit in 4 to 8 weeks.  Discussed potential side effects.  She is not interested in counseling because she cannot afford that.    Patient has been advised of split billing requirements and indicates understanding:   Unsure patient understood    The patient's current medical problems were reviewed.      The following health maintenance schedule was reviewed with the patient and provided in printed form in the after visit summary:   Health Maintenance   Topic Date Due     ASTHMA ACTION PLAN  1987     Pneumococcal Vaccine: Pediatrics (0 to 5 Years) and At-Risk Patients (6 to 64 Years) (1 of 1 - PPSV23) 07/18/1993     HIV SCREENING  07/18/2002     INFLUENZA VACCINE RULE BASED (1) 08/01/2020     Asthma Control Test  08/20/2020     MEDICARE ANNUAL WELLNESS VISIT  08/20/2020     ADVANCE CARE PLANNING  02/01/2021     PAP SMEAR  08/20/2024     HPV TEST  08/20/2024     TD 18+ HE  12/05/2027     Pneumococcal Vaccine: 65+ Years (1 of 1 - PPSV23) 07/18/2052     HEPATITIS B VACCINES  Completed     TDAP ADULT ONE TIME DOSE  Completed        Subjective:   Chief Complaint: Sharon Moraes is an 35 y.o. female here for an Annual Wellness visit.   HPI:      pap 8/2019  - ACUS with HPV+, colp 9/30/2019  nexplanon 8/26/2019  Last lipids, glucose, hgb wnl  2016    Mild persistent asthma  ACT 22 and AP completed    Anxiety -   She gets overwhelmed when things get busy at work, she works in a restaurant  H/o anxiety but never took meds or tried therapy b/c cost  PHQ2 = 0   SONALI 7  = 6 = somewhat difficult  Wants to try meds    H/o ADHD - never taking any meds for this    Review of Systems:    Please see above.  The rest of the review of systems are negative for all systems.    Patient Care Team:  Sharon Zavala CNP as PCP - General (Family Medicine)  Rep Maritza Payee as   Sharon Zavala CNP as Assigned PCP     Patient Active Problem List   Diagnosis     Myopia     Amblyopia     Anxiety     Attention deficit hyperactivity disorder (ADHD)     Borderline Intellectual Functioning     Asthma     Recent Weight Gain (___ Lbs)     Allergy To Dust     Mild persistent asthma, uncomplicated     Contraceptive management     Nexplanon insertion     Atypical squamous cells of undetermined significance on cytologic smear of cervix (ASC-US)     Past Medical History:   Diagnosis Date     ADHD (attention deficit hyperactivity disorder)      Anxiety      Asthma      Borderline intellectual functioning      Depression     Anxiety since high school       Past Surgical History:   Procedure Laterality Date     WISDOM TOOTH EXTRACTION        Family History   Problem Relation Age of Onset     Diabetes Mother      Cancer Neg Hx      Stroke Neg Hx       Social History     Socioeconomic History     Marital status: Single     Spouse name: Not on file     Number of children: Not on file     Years of education: Not on file     Highest education level: Not on file   Occupational History     Not on file   Social Needs     Financial resource strain: Not on file     Food insecurity     Worry: Not on file     Inability: Not on file     Transportation needs     Medical: Not on  "file     Non-medical: Not on file   Tobacco Use     Smoking status: Never Smoker     Smokeless tobacco: Never Used   Substance and Sexual Activity     Alcohol use: No     Drug use: No     Sexual activity: Yes     Partners: Male   Lifestyle     Physical activity     Days per week: Not on file     Minutes per session: Not on file     Stress: Not on file   Relationships     Social connections     Talks on phone: Not on file     Gets together: Not on file     Attends Anabaptist service: Not on file     Active member of club or organization: Not on file     Attends meetings of clubs or organizations: Not on file     Relationship status: Not on file     Intimate partner violence     Fear of current or ex partner: Not on file     Emotionally abused: Not on file     Physically abused: Not on file     Forced sexual activity: Not on file   Other Topics Concern     Not on file   Social History Narrative     Not on file      Current Outpatient Medications   Medication Sig Dispense Refill     albuterol (PROAIR HFA;PROVENTIL HFA;VENTOLIN HFA) 90 mcg/actuation inhaler INHALE 2 PUFFS BY MOUTH FOUR TIMES DAILY AS NEEDED FOR WHEEZING WITH SPACER 18 g 0     albuterol (PROVENTIL) 2.5 mg /3 mL (0.083 %) nebulizer solution USE 1 VIAL VIA NEBULIZER DAILY(MIX WITH BUDESONIDE) 180 mL 0     budesonide (PULMICORT) 0.25 mg/2 mL nebulizer solution INHALE 1 VIAL VIA NEBULIZER TWICE DAILY. MIX WITH ALBUTEROL 60 mL 5     budesonide (PULMICORT) 90 mcg/actuation inhaler Inhale 1 puff 2 (two) times a day. 1 Inhaler 6     montelukast (SINGULAIR) 10 mg tablet TAKE 1 TABLET(10 MG) BY MOUTH AT BEDTIME 90 tablet 3     No current facility-administered medications for this visit.       Objective:   Vital Signs:   Visit Vitals  /74 (Patient Site: Left Arm, Patient Position: Sitting, Cuff Size: Adult Regular)   Pulse 80   Temp 98.5  F (36.9  C) (Oral)   Resp 20   Ht 5' 5.9\" (1.674 m)   Wt 206 lb 3 oz (93.5 kg)   LMP 09/26/2020 (Exact Date) "   Breastfeeding No   BMI 33.38 kg/m           PHYSICAL EXAM  OBJECTIVE:  Vitals listed above within normal limits  General appearance: well groomed, pleasant, well hydrated, nontoxic appearing  ENT: PERRL, throat clear  Neck: neck supple, no lymphadenopathy, no thyromegaly  Lungs: lungs clear to auscultation bilaterally, no wheezes or rhonchi  Heart: regular rate and rhythm, no murmurs, rubs or gallops  Abdomen: soft, nontender  Neuro: no focal deficits, CN II-XII grossly intact, alert and oriented  Psych:  mood stable, appears to have good insight and judgment  Pelvic exam:   External genitalia: normal  Vaginal discharge: Thin white discharge (patient reports this is normal when she has her and goes away every month and is not causing any itching or irritation.  So she declined any testing for vaginal discharge)  Cervix: no inflammation, discharge, bleeding or lesions noted.  Bimanual exam: No cervical motion tenderness. No masses  BREAST EXAM: normal without suspicious masses, skin or nipple changes or axillary nodes, self-exam is taught and discussed          Assessment Results 10/1/2020   Activities of Daily Living No help needed   Instrumental Activities of Daily Living No help needed   Mini Cog Total Score 5   Some recent data might be hidden

## 2021-06-12 NOTE — PROGRESS NOTES
"8/20/19 ASCUS, +HR HPV, not 16/18  9/30/19 [facility]- \"Ok\". Plan 1 yr co-test  10/1/20 [facility]IL, +HR HPV, not 16/18. Plan 1 yr co-test   "

## 2021-06-12 NOTE — TELEPHONE ENCOUNTER
Please let patient know that her Pap smear was normal but the HPV was positive.  So we will need to repeat her Pap smear next year.  Also the Pap patient had an incidental finding of vaginal yeast infection.  If she is having any vaginal discharge, odor, itching then I can order prescription for her.  If she is not having any symptoms then we can just monitor for now.  Dr. Liana Moyer  10/22/2020              ----- Message from Vijaya Bates RN sent at 10/21/2020  8:24 AM CDT -----  Pt notified of results through [facility]. There was also an incidental finding on her pap smear consistent with Candida spp. If follow up from this incidental finding is needed, please forward to your nurse team as incidental findings are not handled by the pap team. Thanks!    Vijaya Bates RN BSN, Pap Tracking

## 2021-06-13 NOTE — PROGRESS NOTES
"Sharon Moraes is a 35 y.o. female who is being evaluated via a billable video visit.      The patient has been notified of following:     \"This video visit will be conducted via a call between you and your physician/provider. We have found that certain health care needs can be provided without the need for an in-person physical exam.  This service lets us provide the care you need with a video conversation.  If a prescription is necessary we can send it directly to your pharmacy.  If lab work is needed we can place an order for that and you can then stop by our lab to have the test done at a later time.    Video visits are billed at different rates depending on your insurance coverage. Please reach out to your insurance provider with any questions.    If during the course of the call the physician/provider feels a video visit is not appropriate, you will not be charged for this service.\"    Patient has given verbal consent to a Video visit? Yes  How would you like to obtain your AVS? AVS Preference: [employer].  If dropped by the video visit, the video invitation should be sent to: Text to cell phone: [phone]  Will anyone else be joining your video visit? No        Video Start Time: 510pm    SUS-17-pky[age]-old female with phone visit to follow-up on anxiety    Anxiety  She was seen on October 1 and at that time was having some generalized anxiety.  She was started on fluoxetine and then scheduled to follow-up to see how she is doing.  Her SONALI 7 improved from 6 to 3  She reports feeling much better and her anxiety and stress of improved dramatically.  She denies any side effects from the medication.  She does report that sometimes she gets sleepy but she is taking the medication at night which is helping.  She feels she is better able to manage the stress at her job at [employer]    Positive HPV  She had a positive Pap with ASCUS and high risk HPV not 16 or 18 in 2019  On repeat Pap on October 1, 2020 she had a normal Pap " smear but still had a positive high risk HPV not 18 or 16  She is not had a boyfriend or any sexual encounters for over a year  Is a non-smoker  Discussed repeating her Pap smear and HPV in a year    Birth control  She has Nexplanon that was inserted on 8/26/2019    Mild persistent asthma that is very well controlled    Current Outpatient Medications   Medication Sig     albuterol (PROAIR HFA;PROVENTIL HFA;VENTOLIN HFA) 90 mcg/actuation inhaler INHALE 2 PUFFS BY MOUTH FOUR TIMES DAILY AS NEEDED FOR WHEEZING WITH SPACER     budesonide (PULMICORT) 0.25 mg/2 mL nebulizer solution INHALE 1 VIAL VIA NEBULIZER TWICE DAILY. MIX WITH ALBUTEROL     budesonide (PULMICORT) 90 mcg/actuation inhaler Inhale 1 puff 2 (two) times a day.     diphenhydrAMINE (BENADRYL) 25 mg capsule Take 1 capsule (25 mg total) by mouth every 4 (four) hours as needed for itching.     FLUoxetine (PROZAC) 10 MG capsule Take 1 capsule (10 mg total) by mouth daily.     loratadine (CLARITIN) 10 mg tablet Take 1 tablet (10 mg total) by mouth daily.       A/P  1. Anxiety  Doing well and symptoms have improved after starting fluoxetine  Will continue at current dose  Will recheck SONALI-7 a follow-up appointment in 6 months  - FLUoxetine (PROZAC) 10 MG capsule; Take 1 capsule (10 mg total) by mouth daily.  Dispense: 30 capsule; Refill: 11    2. Nexplanon in place inserted 8/26/2019  Updated on med list      3. Mild persistent asthma, uncomplicated  Doing well with current regimen    4. Human papilloma virus (HPV) DNA test positive   H/o ASCUS with positive high risk HPV cervical in 2019 and then normal pap in 2020   Repeat Pap with HPV in October 2021      Video-Visit Details    Type of service:  Video Visit    Video End Time (time video stopped): 535pm  Originating Location (pt. Location):home    Distant Location (provider location):  [facility]     Platform used for Video Visit: Goldie Moyer  11/12/2020

## 2021-06-15 PROBLEM — Z30.9 CONTRACEPTIVE MANAGEMENT: Status: ACTIVE | Noted: 2017-03-06

## 2021-06-15 PROBLEM — J45.30 MILD PERSISTENT ASTHMA, UNCOMPLICATED: Status: ACTIVE | Noted: 2017-03-06

## 2021-06-17 NOTE — PROGRESS NOTES
Sharon Moraes is a 35 y.o. female who is being evaluated via a billable video visit.      How would you like to obtain your AVS? Mail a copy.  If dropped from the video visit, the video invitation should be resent by: Text to cell phone: [phone]  Will anyone else be joining your video visit? No      Video Start Time: 5:59 PM    Assessment & Plan     Anxiety  Symptoms much improved and will continue on current dose of fluoxetine 10 mg    Mild persistent asthma, uncomplicated  Symptoms controlled with current regimen.  She has a new nebulizer machine and tubing.  She says she has plenty of refills    Insurance coverage problems  She needs some assistance with getting on the proper insurance plan.  - Ambulatory referral to Care Management (Primary Care)      Diagnosis or treatment significantly limited by social determinants of health - Low health literacy and poverty  {Return in about 6 months (around 11/13/2021) for Annual physical and f/u anxiety.    Liana Moyer MD  [facility] [place]    Subjective   Sharon Moraes is 35 y.o. and presents today for the following health issues   HPI     F/u anxiety  GAD7 = 0 today (down from 3 on 11/12/20 and 6 on 10/1/20)  prozac 10mg    Mild persistent asthma  ACT and asthma action plan completed 10/1/20  Continue budesonide for controller med and albuterol as needed    covid vaccine completed    Insurance -   Insurance recently changed and not her meds her cost more  She is no longer getting social security  She works 10 hrs per week.     Review of Systems   Please see above.  The rest of the review of systems are negative for all systems.         Objective       Vitals:  No vitals were obtained today due to virtual visit.    Physical Exam      Video-Visit Details    Type of service:  Video Visit    Video End Time (time video stopped): 615pm  Originating Location (pt. Location): Home    Distant Location (provider location):  [facility]  [place]     Platform used for Video Visit: [employer]

## 2021-06-17 NOTE — PROGRESS NOTES
Clinic Care Coordination Contact  [facility]/Voicemail      Clinical Data: [facility]W Outreach    Outreach attempted x 1 regarding CCC enrollment.  Left message on patient's voicemail with call back information and requested return call.    Plan: [facility]W will attempt another outreach to the patient via phone regarding enrollment into CCC.

## 2021-06-17 NOTE — PROGRESS NOTES
Community Health Worker attempted to reach the patient for a second time and left a message for the patient.  If the patient is returning my call, please transfer the patient to Maryse at ext. [phone].       Patient has been mailed a unreachable letter and was provided with [facility]W contact information if they are interested in accessing Clinic Care Coordination.      Order for Care Management has been closed, no further outreach will be done at this time and patient can be re-referred.

## 2021-06-17 NOTE — PATIENT INSTRUCTIONS - HE
Patient Instructions by Sharon Zavala CNP at 8/20/2019  9:40 AM     Author: Sharon Zavala CNP Service: -- Author Type: Nurse Practitioner    Filed: 8/20/2019 10:03 AM Encounter Date: 8/20/2019 Status: Signed    : Sharon Zavala CNP (Nurse Practitioner)         Patient Education     Exercise for a Healthier Heart  You may wonder how you can improve the health of your heart. If youre thinking about exercise, youre on the right track. You dont need to become an athlete, but you do need a certain amount of brisk exercise to help strengthen your heart. If you have been diagnosed with a heart condition, your doctor may recommend exercise to help stabilize your condition. To help make exercise a habit, choose safe, fun activities.       Be sure to check with your health care provider before starting an exercise program.    Why exercise?  Exercising regularly offers many healthy rewards. It can help you do all of the following:    Improve your blood cholesterol levels to help prevent further heart trouble    Lower your blood pressure to help prevent a stroke or heart attack    Control diabetes, or reduce your risk of getting this disease    Improve your heart and lung function    Reach and maintain a healthy weight    Make your muscles stronger and more limber so you can stay active    Prevent falls and fractures by slowing the loss of bone mass (osteoporosis)    Manage stress better  Exercise tips  Ease into your routine. Set small goals. Then build on them.  Exercise on most days. Aim for a total of 150 or more minutes of moderate to  vigorous intensity activity each week. Consider 40 minutes, 3 to 4 times a week. For best results, activity should last for 40 minutes on average. It is OK to work up to the 40 minute period over time. Examples of moderate-intensity activity is walking one mile in 15 minutes or 30 to 45 minutes of yard work.  Step up your daily activity level. Along with your  exercise program, try being more active throughout the day. Walk instead of drive. Do more household tasks or yard work.  Choose one or more activities you enjoy. Walking is one of the easiest things you can do. You can also try swimming, riding a bike, or taking an exercise class.  Stop exercising and call your doctor if you:    Have chest pain or feel dizzy or lightheaded    Feel burning, tightness, pressure, or heaviness in your chest, neck, shoulders, back, or arms    Have unusual shortness of breath    Have increased joint or muscle pain    Have palpitations or an irregular heartbeat      3065-8110 [employer]. [address] [place] [address]. All rights reserved. This information is not intended as a substitute for professional medical care. Always follow your healthcare professional's instructions.         Patient Education   Understanding [employer] MyPlate  The USDA (US Department of Agriculture) has guidelines to help you make healthy food choices. These are called MyPlate. MyPlate shows the food groups that make up healthy meals using the image of a place setting. Before you eat, think about the healthiest choices for what to put onto your plate or into your cup or bowl. To learn more about building a healthy plate, visit www.choosemyplate.gov.       The Food Groups    Fruits: Any fruit or 100% fruit juice counts as part of the Fruit Group. Fruits may be fresh, canned, frozen, or dried, and may be whole, cut-up, or pureed. Make half your plate fruits and vegetables.    Vegetables: Any vegetable or 100% vegetable juice counts as a member of the Vegetable Group. Vegetables may be fresh, frozen, canned, or dried. They can be served raw or cooked and may be whole, cut-up, or mashed. Make half your plate fruits and vegetables.     Grains: All foods made from grains are part of the Grains Group. These include wheat, rice, oats, cornmeal, and barley such as bread, pasta, oatmeal, cereal,  tortillas, and grits. Grains should be no more than a quarter of your plate. At least half of your grains should be whole grains.    Protein: This group includes meat, poultry, seafood, beans and peas, eggs, processed soy products (like tofu), nuts (including nut butters), and seeds. Make protein choices no more than a quarter of your plate. Meat and poultry choices should be lean or low fat.    Dairy: All fluid milk products and foods made from milk that contain calcium, like yogurt and cheese are part of the Dairy Group. (Foods that have little calcium, such as cream, butter, and cream cheese, are not part of the group.) Most dairy choices should be low-fat or fat-free.    Oils: These are fats that are liquid at room temperature. They include canola, corn, olive, soybean, and sunflower oil. Foods that are mainly oil include mayonnaise, certain salad dressings, and soft margarines. You should have only 5 to 7 teaspoons of oils a day. You probably already get this much from the food you eat.  Use I-Stand to Help Build Your Meals  The [employer] can help you plan and track your meals and activity. You can look up individual foods to see or compare their nutritional value. You can get guidelines for what and how much you should eat. You can compare your food choices. And you can assess personal physical activities and see ways you can improve. Go to www.Big Health.gov/supertracker/.    2685-3958 [employer]. [address] [place] [address], [place] [address]. All rights reserved. This information is not intended as a substitute for professional medical care. Always follow your healthcare professional's instructions.           Patient Education   Understanding Advance Care Planning  Advance care planning is the process of deciding ones own future medical care. It helps ensure that if you cant speak for yourself, your wishes can still be carried out. The plan is a series of legal documents that note a  persons wishes. The documents vary by state. Advance care planning may be done when a person has a serious illness that is expected to get worse. It may be done before major surgery. And it can help you and your family be prepared in case of a major illness or injury. Advance care planning helps with making decisions at these times.       A health care proxy is a person who acts as the voice of a patient when the patient cant speak for himself or herself. The name of this role varies by state. It may be called a Durable Medical Power of  or Durable Power of  for Healthcare. It may be called an agent, surrogate, or advocate. Or it may be called a representative or decision maker. It is an official duty that is identified by a legal document. The document also varies by state.    Why Is Advance Care Planning Important?  If a person communicates their healthcare wishes:    They will be given medical care that matches their values and goals.    Their family members will not be forced to make decisions in a crisis with no guidance.  Creating a Plan  Making an advance care plan is often done in 3 steps:    Thinking about ones wishes. To create an advance care plan, you should think about what kind of medical treatment you would want if you lose the ability to communicate. Are there any situations in which you would refuse or stop treatment? Are there therapies you would want or not want? And whom do you want to make decisions for you? There are many places to learn more about how to plan for your care. Ask your doctor or  for resources.    Picking a health care proxy. This means choosing a trusted person to speak for you only when you cant speak for yourself. When you cannot make medical decisions, your proxy makes sure the instructions in your advance care plan are followed. A proxy does not make decisions based on his or her own opinions. They must put aside those opinions and values if  needed, and carry out your wishes.    Filling out the legal documents. There are several kinds of legal documents for advance care planning. Each one tells health care providers your wishes. The documents may vary by state. They must be signed and may need to be witnessed or notarized. You can cancel or change them whenever you wish. Depending on your state, the documents may include a Healthcare Proxy form, Living Will, Durable Medical Power of , Advance Directive, or others.  The Familys Role  The best help a family can give is to support their loved ones wishes. Open and honest communication is vital. Family should express any concerns they have about the patients choices while the patient can still make decisions.    4666-1896 [employer]. [address] [place] [address], [place] [address]. All rights reserved. This information is not intended as a substitute for professional medical care. Always follow your healthcare professional's instructions.         Also, [facility] offers a free, downloadable health care directive that allows you to share your treatment choices and personal preferences if you cannot communicate your wishes. It also allows you to appoint another person (called a health care agent) to make health care decisions if you are unable to do so. You can download an advance directive by going here: [email or web address]     Patient Education   Personalized Prevention Plan  You are due for the preventive services outlined below.  Your care team is available to assist you in scheduling these services.  If you have already completed any of these items, please share that information with your care team to update in your medical record.  Health Maintenance   Topic Date Due   ? HIV SCREENING  07/18/2002   ? INFLUENZA VACCINE RULE BASED (1) 08/01/2019   ? ASTHMA CONTROL TEST  08/06/2019   ? MEDICARE ANNUAL WELLNESS VISIT  08/06/2019   ? ADVANCE CARE  PLANNING  02/01/2021   ? PAP SMEAR  07/28/2021   ? TD 18+ HE  12/05/2027   ? TDAP ADULT ONE TIME DOSE  Completed

## 2021-06-19 NOTE — PROGRESS NOTES
Assessment and Plan:       1. Routine general medical examination at a health care facility      2. Encounter for surveillance of injectable contraceptive  Counseled patient, discussed possible weight gain with medication.  - medroxyPROGESTERone injection 150 mg (DEPO-PROVERA); Inject 1 mL (150 mg total) into the shoulder, thigh, or buttocks every 3 (three) months.    3. Mild persistent asthma, uncomplicated  See flowsheet for ACT=21 today. Controlled.  Patient did not need refills today-may call in future if symptoms controlled and needs refills.       The patient's current medical problems were reviewed.    The following high BMI interventions were performed this visit: encouragement to exercise and dietary needs education  The following health maintenance schedule was reviewed with the patient and provided in printed form in the after visit summary:   Health Maintenance   Topic Date Due     ASTHMA CONTROL TEST  1985     ASTHMA FOLLOW-UP  02/15/2017     INFLUENZA VACCINE RULE BASED (1) 08/01/2018     ADVANCE DIRECTIVES DISCUSSED WITH PATIENT  02/01/2021     PAP SMEAR  07/28/2021     TD 18+ HE  12/05/2027     TDAP ADULT ONE TIME DOSE  Completed        Subjective:   Chief Complaint: Sharon Moraes is an 33 y.o. female here for an Annual Wellness visit.     HPI:  She is also due for her depo-provera injections.      Does not believe she needs refills of other medications at this time.    Review of Systems:    Please see above.  The rest of the review of systems are negative for all systems.    Patient Care Team:  Sharon Zavala, CNP as PCP - General (Family Medicine)  Anabelle Santamaria, Rep [place] as      Patient Active Problem List   Diagnosis     Myopia     Amblyopia     Anxiety     Attention-deficit Hyperactivity Disorder     Borderline Intellectual Functioning     Asthma     Recent Weight Gain (___ Lbs)     Allergy To Dust     Mild persistent asthma, uncomplicated     Contraceptive  "management     Past Medical History:   Diagnosis Date     ADHD (attention deficit hyperactivity disorder)      Anxiety      Asthma      Borderline intellectual functioning       Past Surgical History:   Procedure Laterality Date     WISDOM TOOTH EXTRACTION        Family History   Problem Relation Age of Onset     Diabetes Mother      Cancer Neg Hx      Stroke Neg Hx       Social History     Social History     Marital status: Single     Spouse name: N/A     Number of children: N/A     Years of education: N/A     Occupational History     Not on file.     Social History Main Topics     Smoking status: Never Smoker     Smokeless tobacco: Never Used     Alcohol use No     Drug use: No     Sexual activity: Yes     Partners: Male     Other Topics Concern     Not on file     Social History Narrative      Current Outpatient Prescriptions   Medication Sig Dispense Refill     albuterol (PROVENTIL) 2.5 mg /3 mL (0.083 %) nebulizer solution USE 1 VIAL VIA NEBULIZER DAILY(MIX WITH BUDESONIDE) 180 mL 0     budesonide (PULMICORT) 0.25 mg/2 mL nebulizer solution INHALE 1 VIAL VIA NEBULIZER TWICE DAILY. MIX WITH ALBUTEROL 60 mL 5     budesonide (PULMICORT) 90 mcg/actuation inhaler Inhale 1 puff 2 (two) times a day. 1 Inhaler 6     montelukast (SINGULAIR) 10 mg tablet TAKE 1 TABLET(10 MG) BY MOUTH AT BEDTIME 90 tablet 0     VENTOLIN HFA 90 mcg/actuation inhaler INHALE 2 PUFFS BY MOUTH FOUR TIMES DAILY AS NEEDED FOR WHEEZING WITH SPACER 18 g 0     No current facility-administered medications for this visit.       Objective:   Vital Signs:   Visit Vitals     /64 (Patient Site: Right Arm, Patient Position: Sitting, Cuff Size: Adult Regular)     Pulse 68     Resp 20     Ht 5' 5.9\" (1.674 m)     Wt 192 lb 11.2 oz (87.4 kg)     Breastfeeding No     BMI 31.2 kg/m2        VisionScreening:  No exam data present     PHYSICAL EXAM  General Appearance: Alert, cooperative, no distress, appears stated age  Head: Normocephalic, without obvious " abnormality, atraumatic  Eyes: PERRL, conjunctiva/corneas clear, EOM's intact  Ears: Normal TM's and external ear canals, both ears  Nose: Nares normal, septum midline,mucosa normal, no drainage  Throat: Lips, mucosa, and tongue normal; teeth and gums normal  Neck: Supple, symmetrical, trachea midline, no adenopathy;  thyroid: not enlarged, symmetric, no tenderness/mass/nodules;   Back: Symmetric, no curvature, ROM normal, no CVA tenderness  Lungs: Clear to auscultation bilaterally, respirations unlabored  Breasts: No breast masses, tenderness, asymmetry, or nipple discharge.  Heart: Regular rate and rhythm, no murmur.   Abdomen: Soft, non-tender, bowel sounds active all four quadrants,  no masses, no organomegaly  Pelvic:Not examined  Extremities: Extremities normal, atraumatic, no cyanosis or edema  Skin: Skin color, texture, turgor normal, no rashes or lesions  Lymph nodes: Cervical, supraclavicular, and axillary nodes normal  Neurologic: Alert.   Psych: Normal affect.  Does not appear anxious or depressed.        Assessment Results 8/6/2018   Activities of Daily Living No help needed   Instrumental Activities of Daily Living No help needed   Mini Cog Total Score 5   Some recent data might be hidden     A Mini-Cog score of 0-2 suggests the possibility of dementia, score of 3-5 suggests no dementia    Identified Health Risks:     She is at risk for lack of exercise and has been provided with information to increase physical activity for the benefit of her well-being.  The patient was counseled and encouraged to consider modifying their diet and eating habits. She was provided with information on recommended healthy diet options.  Information regarding advance directives (living blandon), including where she can download the appropriate form, was provided to the patient via the AVS.

## 2021-06-20 NOTE — LETTER
Letter by Liana Moyer MD at      Author: Liana Moyer MD Service: -- Author Type: --    Filed:  Encounter Date: 10/1/2020 Status: (Other)       My Asthma Action Plan    Name: Sharon Moraes   YOB: 1985  Date: 10/1/2020   My doctor: Liana Moyer MD   My clinic: [facility]        My Control Medicine: Budesonide (Pulmicort) nebulizer solution -  0.25mg/2ml daily  My Rescue Medicine: Albuterol (Proair/Ventolin/Proventil HFA) 2-4 puffs EVERY 4 HOURS as needed. Use a spacer if recommended by your provider.   My Oral Steroid Medicine: needs to see MD My Asthma Severity:   Mild Persistent  Know your asthma triggers: cold air               GREEN ZONE   Good Control    I feel good    No cough or wheeze    Can work, sleep and play without asthma symptoms     Take your asthma control medicine every day.     1. If exercise triggers your asthma, take your rescue medication    15 minutes before exercise or sports, and    During exercise if you have asthma symptoms  2. Spacer to use with inhaler: If you have a spacer, make sure to use it with your inhaler             YELLOW ZONE Getting Worse  I have ANY of these:    I do not feel good    Cough or wheeze    Chest feels tight    Wake up at night 1. Keep taking your Green Zone medications  2. Start taking your rescue medicine:    every 20 minutes for up to 1 hour. Then every 4 hours for 24-48 hours.  3. If you stay in the Yellow Zone for more than 12-24 hours, contact your doctor.  4. If you do not return to the Green Zone in 12-24 hours or you get worse, start taking your oral steroid medicine if prescribed by your provider.           RED ZONE Medical Alert - Get Help  I have ANY of these:    I feel awful    Medicine is not helping    Breathing getting harder    Trouble walking or talking    Nose opens wide to breathe     1. Take your rescue medicine NOW  2. If your provider has prescribed an oral steroid medicine, start  taking it NOW  3. Call your doctor NOW  4. If you are still in the Red Zone after 20 minutes and you have not reached your doctor:    Take your rescue medicine again and    Call 911 or go to the emergency room right away    See your regular doctor within 2 weeks of an Emergency Room or Urgent Care visit for follow-up treatment.          Annual Reminders:  Meet with Asthma Educator,  Flu Shot in the Fall, consider Pneumonia Vaccination for patients with asthma (aged 19 and older).    Pharmacy:   [id number] [place] [address] [address] [place] [address] [address] [place] [address]  Phone: [phone] Fax: [phone]      Electronically signed by Liana Moyer MD   Date: 10/01/20                    Asthma Triggers  How To Control Things That Make Your Asthma Worse    Triggers are things that make your asthma worse.  Look at the list below to help you find your triggers and what you can do about them.  You can help prevent asthma flare-ups by staying away from your triggers.      Trigger                                                          What you can do   Cigarette Smoke  Tobacco smoke can make asthma worse. Do not allow smoking in your home, car or around you.  Be sure no one smokes at a sukhi day care or school.  If you smoke, ask your health care provider for ways to help you quit.  Ask family members to quit too.  Ask your health care provider for a referral to Quit Plan to help you quit smoking, or call 9-803-056-PLAN.     Colds, Flu, Bronchitis  These are common triggers of asthma. Wash your hands often.  Dont touch your eyes, nose or mouth.  Get a flu shot every year.     Dust Mites  These are tiny bugs that live in cloth or carpet. They are too small to see. Wash sheets and blankets in hot water every week.   Encase pillows and mattress in dust mite proof covers.  Avoid having carpet if you can. If you have carpet, vacuum weekly.   Use a dust  mask and HEPA vacuum.   Pollen and Outdoor Mold  Some people are allergic to trees, grass, or weed pollen, or molds. Try to keep your windows closed.  Limit time out doors when pollen count is high.   Ask you health care provider about taking medicine during allergy season.     Animal Dander  Some people are allergic to skin flakes, urine or saliva from pets with fur or feathers. Keep pets with fur or feathers out of your home.    If you cant keep the pet outdoors, then keep the pet out of your bedroom.  Keep the bedroom door closed.  Keep pets off cloth furniture and away from stuffed toys.     Mice, Rats, and Cockroaches   Some people are allergic to the waste from these pests.   Cover food and garbage.  Clean up spills and food crumbs.  Store grease in the refrigerator.   Keep food out of the bedroom.   Indoor Mold  This can be a trigger if your home has high moisture. Fix leaking faucets, pipes, or other sources of water.   Clean moldy surfaces.  Dehumidify basement if it is damp and smelly.   Smoke, Strong Odors, and Sprays  These can reduce air quality. Stay away from strong odors and sprays, such as perfume, powder, hair spray, paints, smoke incense, paint, cleaning products, candles and new carpet.   Exercise or Sports  Some people with asthma have this trigger. Be active!  Ask your doctor about taking medicine before sports or exercise to prevent symptoms.    Warm up for 5-10 minutes before and after sports or exercise.     Other Triggers of Asthma  Cold air:  Cover your nose and mouth with a scarf.  Sometimes laughing or crying can be a trigger.  Some medicines and food can trigger asthma.

## 2021-06-21 NOTE — LETTER
Letter by Maryse Willoughby CHW at      Author: Maryse Willoughby CHW Service: -- Author Type: --    Filed:  Encounter Date: 5/14/2021 Status: (Other)       CARE COORDINATION  [address], [address]  [place] [address]    May 17, 2021    Sharon Moraes  [address]  [facility] [address]      Dear Sharon,    I am a clinic community health worker who works with Liana Moyer MD at [facility]. I have been trying to reach you recently to introduce Clinic Care Coordination and to see if there was anything I could assist you with.  Below is a description of clinic care coordination and how I can further assist you.      The clinic care coordination team is made up of a registered nurse,  and community health worker who understand the health care system. The goal of clinic care coordination is to help you manage your health and improve access to the health care system in the most efficient manner. The team can assist you in meeting your health care goals by providing education, coordinating services, strengthening the communication among your providers and supporting you with any resource needs.    Please feel free to contact me at [phone] with any questions or concerns. We are focused on providing you with the highest-quality healthcare experience possible and that all starts with you.     Sincerely,     Maryse Willoughby  Community Health Worker

## 2021-06-23 NOTE — TELEPHONE ENCOUNTER
Refill Approved    Rx renewed per Medication Renewal Policy. Medication was last renewed on 3/20/18.    Ernestina Paige, Care Connection Triage/Med Refill 1/18/2019     Requested Prescriptions   Pending Prescriptions Disp Refills     VENTOLIN HFA 90 mcg/actuation inhaler [Pharmacy Med Name: VENTOLIN HFA INH W/DOS CTR 200PUFFS] 18 g 0     Sig: INHALE 2 PUFFS BY MOUTH FOUR TIMES DAILY AS NEEDED FOR WHEEZING WITH SPACER    Albuterol/Levalbuterol Refill Protocol Passed - 1/17/2019  9:24 AM       Passed - PCP or prescribing provider visit in last year    Last office visit with prescriber/PCP: 3/6/2017 Sharon Zavala CNP OR same dept: Visit date not found OR same specialty: 3/6/2017 Sharon Zavala CNP Last physical: 7/28/2016       Next appt within 3 mo: Visit date not found  Next physical within 3 mo: Visit date not found  Prescriber OR PCP: Sharon Zavala CNP  Last diagnosis associated with med order: 1. Mild persistent asthma, uncomplicated  - VENTOLIN HFA 90 mcg/actuation inhaler [Pharmacy Med Name: VENTOLIN HFA INH W/DOS CTR 200PUFFS]; INHALE 2 PUFFS BY MOUTH FOUR TIMES DAILY AS NEEDED FOR WHEEZING WITH SPACER  Dispense: 18 g; Refill: 0    If protocol passes may refill for 6 months if within 3 months of last provider visit (or a total of 9 months). If patient requesting >1 inhaler per month refill x 6 months and have patient make appointment with provider.

## 2021-07-14 PROBLEM — Z30.017 NEXPLANON INSERTION: Status: RESOLVED | Noted: 2019-08-26 | Resolved: 2020-11-12

## 2021-08-21 ENCOUNTER — HEALTH MAINTENANCE LETTER (OUTPATIENT)
Age: 36
End: 2021-08-21

## 2021-09-28 ENCOUNTER — OFFICE VISIT (OUTPATIENT)
Dept: FAMILY MEDICINE | Facility: CLINIC | Age: 36
End: 2021-09-28
Payer: MEDICARE

## 2021-09-28 VITALS
WEIGHT: 205.38 LBS | OXYGEN SATURATION: 97 % | TEMPERATURE: 98.7 F | DIASTOLIC BLOOD PRESSURE: 68 MMHG | BODY MASS INDEX: 33.01 KG/M2 | HEART RATE: 82 BPM | HEIGHT: 66 IN | SYSTOLIC BLOOD PRESSURE: 100 MMHG | RESPIRATION RATE: 16 BRPM

## 2021-09-28 DIAGNOSIS — N89.8 VAGINAL DISCHARGE: ICD-10-CM

## 2021-09-28 DIAGNOSIS — R87.810 ASCUS WITH POSITIVE HIGH RISK HPV CERVICAL: ICD-10-CM

## 2021-09-28 DIAGNOSIS — E55.9 VITAMIN D DEFICIENCY: ICD-10-CM

## 2021-09-28 DIAGNOSIS — F41.1 ANXIETY STATE: ICD-10-CM

## 2021-09-28 DIAGNOSIS — Z00.00 ANNUAL PHYSICAL EXAM: Primary | ICD-10-CM

## 2021-09-28 DIAGNOSIS — Z11.4 ENCOUNTER FOR SCREENING FOR HIV: ICD-10-CM

## 2021-09-28 DIAGNOSIS — Z11.59 ENCOUNTER FOR HEPATITIS C SCREENING TEST FOR LOW RISK PATIENT: ICD-10-CM

## 2021-09-28 DIAGNOSIS — J45.30 MILD PERSISTENT ASTHMA, UNCOMPLICATED: ICD-10-CM

## 2021-09-28 DIAGNOSIS — R87.610 ASCUS WITH POSITIVE HIGH RISK HPV CERVICAL: ICD-10-CM

## 2021-09-28 LAB
ALBUMIN SERPL-MCNC: 4.2 G/DL (ref 3.5–5)
ALP SERPL-CCNC: 77 U/L (ref 45–120)
ALT SERPL W P-5'-P-CCNC: 23 U/L (ref 0–45)
ANION GAP SERPL CALCULATED.3IONS-SCNC: 11 MMOL/L (ref 5–18)
AST SERPL W P-5'-P-CCNC: 17 U/L (ref 0–40)
BASOPHILS # BLD AUTO: 0 10E3/UL (ref 0–0.2)
BASOPHILS NFR BLD AUTO: 0 %
BILIRUB SERPL-MCNC: 0.4 MG/DL (ref 0–1)
BUN SERPL-MCNC: 10 MG/DL (ref 8–22)
CALCIUM SERPL-MCNC: 9.4 MG/DL (ref 8.5–10.5)
CHLORIDE BLD-SCNC: 108 MMOL/L (ref 98–107)
CHOLEST SERPL-MCNC: 213 MG/DL
CLUE CELLS: ABNORMAL
CO2 SERPL-SCNC: 21 MMOL/L (ref 22–31)
CREAT SERPL-MCNC: 0.65 MG/DL (ref 0.6–1.1)
EOSINOPHIL # BLD AUTO: 0.3 10E3/UL (ref 0–0.7)
EOSINOPHIL NFR BLD AUTO: 4 %
ERYTHROCYTE [DISTWIDTH] IN BLOOD BY AUTOMATED COUNT: 12.9 % (ref 10–15)
FASTING STATUS PATIENT QL REPORTED: YES
GFR SERPL CREATININE-BSD FRML MDRD: >90 ML/MIN/1.73M2
GLUCOSE BLD-MCNC: 93 MG/DL (ref 70–125)
HCT VFR BLD AUTO: 41.5 % (ref 35–47)
HDLC SERPL-MCNC: 54 MG/DL
HGB BLD-MCNC: 14.2 G/DL (ref 11.7–15.7)
IMM GRANULOCYTES # BLD: 0.1 10E3/UL
IMM GRANULOCYTES NFR BLD: 1 %
LDLC SERPL CALC-MCNC: 146 MG/DL
LYMPHOCYTES # BLD AUTO: 2.1 10E3/UL (ref 0.8–5.3)
LYMPHOCYTES NFR BLD AUTO: 27 %
MCH RBC QN AUTO: 29.3 PG (ref 26.5–33)
MCHC RBC AUTO-ENTMCNC: 34.2 G/DL (ref 31.5–36.5)
MCV RBC AUTO: 86 FL (ref 78–100)
MONOCYTES # BLD AUTO: 0.5 10E3/UL (ref 0–1.3)
MONOCYTES NFR BLD AUTO: 7 %
NEUTROPHILS # BLD AUTO: 4.8 10E3/UL (ref 1.6–8.3)
NEUTROPHILS NFR BLD AUTO: 61 %
PLATELET # BLD AUTO: 436 10E3/UL (ref 150–450)
POTASSIUM BLD-SCNC: 4.3 MMOL/L (ref 3.5–5)
PROT SERPL-MCNC: 7.2 G/DL (ref 6–8)
RBC # BLD AUTO: 4.85 10E6/UL (ref 3.8–5.2)
SODIUM SERPL-SCNC: 140 MMOL/L (ref 136–145)
TRICHOMONAS, WET PREP: ABNORMAL
TRIGL SERPL-MCNC: 66 MG/DL
WBC # BLD AUTO: 7.8 10E3/UL (ref 4–11)
WBC'S/HIGH POWER FIELD, WET PREP: ABNORMAL
YEAST, WET PREP: PRESENT

## 2021-09-28 PROCEDURE — 87591 N.GONORRHOEAE DNA AMP PROB: CPT | Performed by: FAMILY MEDICINE

## 2021-09-28 PROCEDURE — 82306 VITAMIN D 25 HYDROXY: CPT | Performed by: FAMILY MEDICINE

## 2021-09-28 PROCEDURE — 80061 LIPID PANEL: CPT | Performed by: FAMILY MEDICINE

## 2021-09-28 PROCEDURE — 85025 COMPLETE CBC W/AUTO DIFF WBC: CPT | Performed by: FAMILY MEDICINE

## 2021-09-28 PROCEDURE — G0123 SCREEN CERV/VAG THIN LAYER: HCPCS | Performed by: FAMILY MEDICINE

## 2021-09-28 PROCEDURE — 87389 HIV-1 AG W/HIV-1&-2 AB AG IA: CPT | Performed by: FAMILY MEDICINE

## 2021-09-28 PROCEDURE — 99395 PREV VISIT EST AGE 18-39: CPT | Performed by: FAMILY MEDICINE

## 2021-09-28 PROCEDURE — 87491 CHLMYD TRACH DNA AMP PROBE: CPT | Performed by: FAMILY MEDICINE

## 2021-09-28 PROCEDURE — 87624 HPV HI-RISK TYP POOLED RSLT: CPT | Performed by: FAMILY MEDICINE

## 2021-09-28 PROCEDURE — 87210 SMEAR WET MOUNT SALINE/INK: CPT | Performed by: FAMILY MEDICINE

## 2021-09-28 PROCEDURE — 36415 COLL VENOUS BLD VENIPUNCTURE: CPT | Performed by: FAMILY MEDICINE

## 2021-09-28 PROCEDURE — 80053 COMPREHEN METABOLIC PANEL: CPT | Performed by: FAMILY MEDICINE

## 2021-09-28 PROCEDURE — 86803 HEPATITIS C AB TEST: CPT | Performed by: FAMILY MEDICINE

## 2021-09-28 RX ORDER — ALBUTEROL SULFATE 90 UG/1
AEROSOL, METERED RESPIRATORY (INHALATION)
Qty: 18 G | Refills: 0 | Status: SHIPPED | OUTPATIENT
Start: 2021-09-28 | End: 2024-08-15

## 2021-09-28 ASSESSMENT — PATIENT HEALTH QUESTIONNAIRE - PHQ9
SUM OF ALL RESPONSES TO PHQ QUESTIONS 1-9: 1
10. IF YOU CHECKED OFF ANY PROBLEMS, HOW DIFFICULT HAVE THESE PROBLEMS MADE IT FOR YOU TO DO YOUR WORK, TAKE CARE OF THINGS AT HOME, OR GET ALONG WITH OTHER PEOPLE: NOT DIFFICULT AT ALL
SUM OF ALL RESPONSES TO PHQ QUESTIONS 1-9: 1

## 2021-09-28 ASSESSMENT — ASTHMA QUESTIONNAIRES
ACT_TOTALSCORE: 22
QUESTION_4 LAST FOUR WEEKS HOW OFTEN HAVE YOU USED YOUR RESCUE INHALER OR NEBULIZER MEDICATION (SUCH AS ALBUTEROL): ONCE A WEEK OR LESS
QUESTION_5 LAST FOUR WEEKS HOW WOULD YOU RATE YOUR ASTHMA CONTROL: WELL CONTROLLED
QUESTION_3 LAST FOUR WEEKS HOW OFTEN DID YOUR ASTHMA SYMPTOMS (WHEEZING, COUGHING, SHORTNESS OF BREATH, CHEST TIGHTNESS OR PAIN) WAKE YOU UP AT NIGHT OR EARLIER THAN USUAL IN THE MORNING: ONCE OR TWICE
QUESTION_2 LAST FOUR WEEKS HOW OFTEN HAVE YOU HAD SHORTNESS OF BREATH: NOT AT ALL
QUESTION_1 LAST FOUR WEEKS HOW MUCH OF THE TIME DID YOUR ASTHMA KEEP YOU FROM GETTING AS MUCH DONE AT WORK, SCHOOL OR AT HOME: NONE OF THE TIME

## 2021-09-28 ASSESSMENT — ACTIVITIES OF DAILY LIVING (ADL): CURRENT_FUNCTION: NO ASSISTANCE NEEDED

## 2021-09-28 ASSESSMENT — MIFFLIN-ST. JEOR: SCORE: 1648.32

## 2021-09-28 NOTE — PATIENT INSTRUCTIONS
My Asthma Action Plan    Name: Sharon Moraes   YOB: 1985  Date: 9/28/2021   My doctor: Liana Moyer MD   My clinic: [facility]        My Control Medicine: Budesonide (Pulmicort) nebulizer solution -  0.5mg/2ml bid prn  My Rescue Medicine: Albuterol (Proair/Ventolin/Proventil HFA) 2-4 puffs EVERY 4 HOURS as needed. Use a spacer if recommended by your provider.  My Oral Steroid Medicine: see MD My Asthma Severity:   Mild Persistent  Know your asthma triggers: smoke, upper respiratory infections, dust mites, strong odors and fumes and cold air               GREEN ZONE   Good Control    I feel good    No cough or wheeze    Can work, sleep and play without asthma symptoms       Take your asthma control medicine every day.     1. If exercise triggers your asthma, take your rescue medication    15 minutes before exercise or sports, and    During exercise if you have asthma symptoms  2. Spacer to use with inhaler: If you have a spacer, make sure to use it with your inhaler             YELLOW ZONE Getting Worse  I have ANY of these:    I do not feel good    Cough or wheeze    Chest feels tight    Wake up at night   1. Keep taking your Green Zone medications  2. Start taking your rescue medicine:    every 20 minutes for up to 1 hour. Then every 4 hours for 24-48 hours.  3. If you stay in the Yellow Zone for more than 12-24 hours, contact your doctor.  4. If you do not return to the Green Zone in 12-24 hours or you get worse, start taking your oral steroid medicine if prescribed by your provider.           RED ZONE Medical Alert - Get Help  I have ANY of these:    I feel awful    Medicine is not helping    Breathing getting harder    Trouble walking or talking    Nose opens wide to breathe       1. Take your rescue medicine NOW  2. If your provider has prescribed an oral steroid medicine, start taking it NOW  3. Call your doctor NOW  4. If you are still in the Red Zone after 20  minutes and you have not reached your doctor:    Take your rescue medicine again and    Call 911 or go to the emergency room right away    See your regular doctor within 2 weeks of an Emergency Room or Urgent Care visit for follow-up treatment.          Annual Reminders:  Meet with Asthma Educator,  Flu Shot in the Fall, consider Pneumonia Vaccination for patients with asthma (aged 19 and older).    Pharmacy: [id number] [address] AT [facility]    Electronically signed by Liana Moyer MD   Date: 09/28/21                      Asthma Triggers  How To Control Things That Make Your Asthma Worse    Triggers are things that make your asthma worse.  Look at the list below to help you find your triggers and what you can do about them.  You can help prevent asthma flare-ups by staying away from your triggers.      Trigger                                                          What you can do   Cigarette Smoke  Tobacco smoke can make asthma worse. Do not allow smoking in your home, car or around you.  Be sure no one smokes at a child s day care or school.  If you smoke, ask your health care provider for ways to help you quit.  Ask family members to quit too.  Ask your health care provider for a referral to Quit Plan to help you quit smoking, or call 8-649-559-PLAN.     Colds, Flu, Bronchitis  These are common triggers of asthma. Wash your hands often.  Don t touch your eyes, nose or mouth.  Get a flu shot every year.     Dust Mites  These are tiny bugs that live in cloth or carpet. They are too small to see. Wash sheets and blankets in hot water every week.   Encase pillows and mattress in dust mite proof covers.  Avoid having carpet if you can. If you have carpet, vacuum weekly.   Use a dust mask and HEPA vacuum.   Pollen and Outdoor Mold  Some people are allergic to trees, grass, or weed pollen, or molds. Try to keep your windows closed.  Limit time out doors  when pollen count is high.   Ask you health care provider about taking medicine during allergy season.     Animal Dander  Some people are allergic to skin flakes, urine or saliva from pets with fur or feathers. Keep pets with fur or feathers out of your home.    If you can t keep the pet outdoors, then keep the pet out of your bedroom.  Keep the bedroom door closed.  Keep pets off cloth furniture and away from stuffed toys.     Mice, Rats, and Cockroaches   Some people are allergic to the waste from these pests.   Cover food and garbage.  Clean up spills and food crumbs.  Store grease in the refrigerator.   Keep food out of the bedroom.   Indoor Mold  This can be a trigger if your home has high moisture. Fix leaking faucets, pipes, or other sources of water.   Clean moldy surfaces.  Dehumidify basement if it is damp and smelly.   Smoke, Strong Odors, and Sprays  These can reduce air quality. Stay away from strong odors and sprays, such as perfume, powder, hair spray, paints, smoke incense, paint, cleaning products, candles and new carpet.   Exercise or Sports  Some people with asthma have this trigger. Be active!  Ask your doctor about taking medicine before sports or exercise to prevent symptoms.    Warm up for 5-10 minutes before and after sports or exercise.     Other Triggers of Asthma  Cold air:  Cover your nose and mouth with a scarf.  Sometimes laughing or crying can be a trigger.  Some medicines and food can trigger asthma.

## 2021-09-28 NOTE — PROGRESS NOTES
"Wet SUBJECTIVE:   Sharon Moraes is a 36 year old female who presents for Preventive Visit.  Annual Exam   Vaccines - up to date, including covid, and flu shot on at [facility] 9/25/21  nexaplanon inserted 8/26/2019 (change next year in 8/2022)  Last sex 4 years ago   LMP last week - GC/CT neg 8/2019   Pap  8/20/19 ASCUS, +HR HPV, not 16/18  9/30/19 [place]- \"Ok\". Plan 1 yr co-test  10/1/20 NIL, +HR HPV, not 16/18     Mild persistent asthma   Pulmicort/budesonide in nebulizer prn, ventolin prn (using 1 x per week)  ACT 22 today     Anxiety -   Fluoxetine is helping  PHQ2 = 0     ADHD -   Not on meds, never been on meds      Social -   Mom passed away at 66 yo on 8/31/21 of \"natural causes\"   Mom had been in a group home for 15 years    Patient has been advised of split billing requirements and indicates understanding: Yes   Are you in the first 12 months of your Medicare coverage?  Yes,  Visual Acuity:  July 19, 201 was seen at eye doctor  Healthy Habits:     In general, how would you rate your overall health?  Good    Frequency of exercise:  2-3 days/week    Duration of exercise:  15-30 minutes    Do you usually eat at least 4 servings of fruit and vegetables a day, include whole grains    & fiber and avoid regularly eating high fat or \"junk\" foods?  No    Taking medications regularly:  No    Medication side effects:  None    Ability to successfully perform activities of daily living:  No assistance needed    Home Safety:  No safety concerns identified    Hearing Impairment:  No hearing concerns    In the past 6 months, have you been bothered by leaking of urine?  No    In general, how would you rate your overall mental or emotional health?  Good      PHQ-2 Total Score: 0    Additional concerns today:  No       Do you feel safe in your environment? Yes    Have you ever done Advance Care Planning? (For example, a Health Directive, POLST, or a discussion with a medical provider or your loved ones about your wishes): No, " advance care planning information given to patient to review.  Patient declined advance care planning discussion at this time.       Fall risk  Timed Up and Go Test (>13.5 is fall risk; contact physician) : 0  No risk   Cognitive Screening   1) Repeat 3 items (Leader, Season, Table)    2) Clock draw: NORMAL  3) 3 item recall: Recalls 3 objects  Results: NORMAL clock, 1-2 items recalled: COGNITIVE IMPAIRMENT LESS LIKELY    Mini-CogTM Copyright MARINA Hernandez. Licensed by the author for use in [facility]; reprinted with permission (devon@[facility]). All rights reserved.      Do you have sleep apnea, excessive snoring or daytime drowsiness?: no    Reviewed and updated as needed this visit by clinical staff  Tobacco  Allergies  Meds   Med Hx  Surg Hx  Fam Hx  Soc Hx        Reviewed and updated as needed this visit by Provider                Social History     Tobacco Use     Smoking status: Never Smoker     Smokeless tobacco: Never Used   Substance Use Topics     Alcohol use: No     If you drink alcohol do you typically have >3 drinks per day or >7 drinks per week? No    Alcohol Use 9/28/2021   Prescreen: >3 drinks/day or >7 drinks/week? No   No flowsheet data found.        Current providers sharing in care for this patient include:   Patient Care Team:  Liana Moyer MD as PCP - General (Family Practice)  Liana Moyer MD as Assigned PCP    The following health maintenance items are reviewed in Epic and correct as of today:  Health Maintenance Due   Topic Date Due     ANNUAL REVIEW OF  ORDERS  Never done     ASTHMA ACTION PLAN  Never done     Pneumococcal Vaccine: Pediatrics (0 to 5 Years) and At-Risk Patients (6 to 64 Years) (1 of 2 - PPSV23) Never done     HIV SCREENING  Never done     HEPATITIS C SCREENING  Never done     MEDICARE ANNUAL WELLNESS VISIT  08/20/2020     ASTHMA CONTROL TEST  04/01/2021     PAP FOLLOW-UP  10/01/2021     HPV FOLLOW-UP  10/01/2021     Patient Active Problem List    Diagnosis     Myopia     Amblyopia     Anxiety     Attention deficit hyperactivity disorder (ADHD)     Borderline Intellectual Functioning     Recent Weight Gain (___ Lbs)     Allergy To Dust     Mild persistent asthma, uncomplicated     Contraceptive management     ASCUS with positive high risk HPV cervical     Past Surgical History:   Procedure Laterality Date     WISDOM TOOTH EXTRACTION         Social History     Tobacco Use     Smoking status: Never Smoker     Smokeless tobacco: Never Used   Substance Use Topics     Alcohol use: No     Family History   Problem Relation Age of Onset     Diabetes Mother      Cancer No family hx of      Cerebrovascular Disease No family hx of          Current Outpatient Medications   Medication Sig Dispense Refill     albuterol (PROAIR HFA/PROVENTIL HFA/VENTOLIN HFA) 108 (90 Base) MCG/ACT inhaler [ALBUTEROL (PROAIR HFA;PROVENTIL HFA;VENTOLIN HFA) 90 MCG/ACTUATION INHALER] INHALE 2 PUFFS BY MOUTH FOUR TIMES DAILY AS NEEDED FOR WHEEZING WITH SPACER 18 g 0     budesonide (PULMICORT) 0.25 mg/2 mL nebulizer solution [BUDESONIDE (PULMICORT) 0.25 MG/2 ML NEBULIZER SOLUTION] INHALE 1 VIAL VIA NEBULIZER TWICE DAILY. MIX WITH ALBUTEROL 60 mL 5     diphenhydrAMINE (BENADRYL) 25 mg capsule [DIPHENHYDRAMINE (BENADRYL) 25 MG CAPSULE] Take 1 capsule (25 mg total) by mouth every 4 (four) hours as needed for itching. 30 capsule 2     etonogestreL (NEXPLANON) 68 mg Impl implant [ETONOGESTREL (NEXPLANON) 68 MG IMPL IMPLANT] Inserted 8/26/2019  0     FLUoxetine (PROZAC) 10 MG capsule [FLUOXETINE (PROZAC) 10 MG CAPSULE] Take 1 capsule (10 mg total) by mouth daily. 30 capsule 11     loratadine (CLARITIN) 10 mg tablet [LORATADINE (CLARITIN) 10 MG TABLET] Take 1 tablet (10 mg total) by mouth daily. 30 tablet 2     No Known Allergies  History of abnormal Pap smear: Last 3 Pap Results: No results found for: PAP      Review of Systems   Please see above.  The rest of the review of systems are negative for  "all systems.      OBJECTIVE:   /68 (BP Location: Left arm, Patient Position: Sitting, Cuff Size: Adult Regular)   Pulse 82   Temp 98.7  F (37.1  C) (Oral)   Resp 16   Ht 1.676 m (5' 6\")   Wt 93.2 kg (205 lb 6 oz)   LMP 09/20/2021 (Exact Date)   SpO2 97%   BMI 33.15 kg/m   Estimated body mass index is 33.15 kg/m  as calculated from the following:    Height as of this encounter: 1.676 m (5' 6\").    Weight as of this encounter: 93.2 kg (205 lb 6 oz).     Vitals:    09/28/21 1320   BP: 100/68   BP Location: Left arm   Patient Position: Sitting   Cuff Size: Adult Regular   Pulse: 82   Resp: 16   Temp: 98.7  F (37.1  C)   TempSrc: Oral   SpO2: 97%   Weight: 93.2 kg (205 lb 6 oz)   Height: 1.676 m (5' 6\")       Physical Exam  GENERAL: healthy, alert and no distress  EYES: Eyes grossly normal to inspection, PERRL and conjunctivae and sclerae normal  HENT: ear canals and TM's normal, nose and mouth without ulcers or lesions  NECK: no adenopathy, no asymmetry, masses, or scars and thyroid normal to palpation  RESP: lungs clear to auscultation - no rales, rhonchi or wheezes  BREAST: normal without masses, tenderness or nipple discharge and no palpable axillary masses or adenopathy  CV: regular rate and rhythm, normal S1 S2, no S3 or S4, no murmur, click or rub, no peripheral edema and peripheral pulses strong  ABDOMEN: soft, nontender, no hepatosplenomegaly, no masses and bowel sounds normal   (female): normal female external genitalia, normal urethral meatus, vaginal mucosa pink, moist, well rugated, and normal cervix/adnexa/uterus without masses or discharge  MS: no gross musculoskeletal defects noted, no edema  SKIN: no suspicious lesions or rashes  NEURO: Normal strength and tone, mentation intact and speech normal  PSYCH: mentation appears normal, affect normal/bright        ASSESSMENT / PLAN:   Sharon was seen today for physical.    Diagnoses and all orders for this visit:    Annual physical exam  -     " "Lipid panel reflex to direct LDL Non-fasting  -     Comprehensive metabolic panel (BMP + Alb, Alk Phos, ALT, AST, Total. Bili, TP)  -     CBC with Platelets & Differential  Vaccines up to date  Repeat pap   Breast exam normal    ASCUS with positive high risk HPV cervical  -     Pap imaged thin layer screen with HPV - recommended age 30 - 65 years (select HPV order below)  -     HPV High Risk Types DNA Cervical    Anxiety  Doing well with fluoxetine    Mild persistent asthma, uncomplicated  ACT and action plan completed  -     albuterol (PROAIR HFA/PROVENTIL HFA/VENTOLIN HFA) 108 (90 Base) MCG/ACT inhaler; [ALBUTEROL (PROAIR HFA;PROVENTIL HFA;VENTOLIN HFA) 90 MCG/ACTUATION INHALER] INHALE 2 PUFFS BY MOUTH FOUR TIMES DAILY AS NEEDED FOR WHEEZING WITH SPACER    Vitamin D deficiency  -     Vitamin D Deficiency    Encounter for screening for HIV  -     HIV Antigen Antibody Combo    Encounter for hepatitis C screening test for low risk patient  -     Hepatitis C antibody    Vaginal discharge  -     Wet prep - Clinic Collect  -     NEISSERIA GONORRHOEA PCR  -     CHLAMYDIA TRACHOMATIS PCR    Wet prep positive for yeast - rx for diflucan ordered    Patient has been advised of split billing requirements and indicates understanding: Yes  COUNSELING:  Reviewed preventive health counseling, as reflected in patient instructions       Regular exercise       Healthy diet/nutrition       Dental care       Safe sex practices/STD prevention       Hepatitis C screening       HIV screening for high risk patient    Estimated body mass index is 33.15 kg/m  as calculated from the following:    Height as of this encounter: 1.676 m (5' 6\").    Weight as of this encounter: 93.2 kg (205 lb 6 oz).    Weight management plan: Discussed healthy diet and exercise guidelines    She reports that she has never smoked. She has never used smokeless tobacco.      Appropriate preventive services were discussed with this patient, including applicable " screening as appropriate for cardiovascular disease, diabetes, osteopenia/osteoporosis, and glaucoma.  As appropriate for age/gender, discussed screening for colorectal cancer, prostate cancer, breast cancer, and cervical cancer. Checklist reviewing preventive services available has been given to the patient.    Reviewed patients plan of care and provided an AVS. The Basic Care Plan (routine screening as documented in Health Maintenance) for Sharon meets the Care Plan requirement. This Care Plan has been established and reviewed with the Patient.    Counseling Resources:  ATP IV Guidelines  Pooled Cohorts Equation Calculator  Breast Cancer Risk Calculator  Breast Cancer: Medication to Reduce Risk  FRAX Risk Assessment  ICSI Preventive Guidelines  Dietary Guidelines for Americans, 2010  [email or web address]'s MyPlate  ASA Prophylaxis  Lung CA Screening    Liana Moyer MD  [facility]    Identified Health Risks:  Answers for HPI/ROS submitted by the patient on 9/28/2021  If you checked off any problems, how difficult have these problems made it for you to do your work, take care of things at home, or get along with other people?: Not difficult at all  PHQ9 TOTAL SCORE: 1

## 2021-09-28 NOTE — LETTER
September 30, 2021      Sharon Packeryusef  [address]   [facility] [address]        Dear ,    We are writing to inform you of your test results.        Resulted Orders   Lipid panel reflex to direct LDL Non-fasting   Result Value Ref Range    Cholesterol 213 (H) <=199 mg/dL    Triglycerides 66 <=149 mg/dL    Direct Measure HDL 54 >=50 mg/dL      Comment:      HDL Cholesterol Reference Range:     0-2 years:   No reference ranges established for patients under 2 years old  [facility].    2-8 years:  Greater than 45 mg/dL     18 years and older:   Female: Greater than or equal to 50 mg/dL   Male:   Greater than or equal to 40 mg/dL    LDL Cholesterol Calculated 146 (H) <=129 mg/dL    Patient Fasting > 8hrs? Yes    Comprehensive metabolic panel (BMP + Alb, Alk Phos, ALT, AST, Total. Bili, TP)   Result Value Ref Range    Sodium 140 136 - 145 mmol/L    Potassium 4.3 3.5 - 5.0 mmol/L    Chloride 108 (H) 98 - 107 mmol/L    Carbon Dioxide (CO2) 21 (L) 22 - 31 mmol/L    Anion Gap 11 5 - 18 mmol/L    Urea Nitrogen 10 8 - 22 mg/dL    Creatinine 0.65 0.60 - 1.10 mg/dL    Calcium 9.4 8.5 - 10.5 mg/dL    Glucose 93 70 - 125 mg/dL    Alkaline Phosphatase 77 45 - 120 U/L    AST 17 0 - 40 U/L    ALT 23 0 - 45 U/L    Protein Total 7.2 6.0 - 8.0 g/dL    Albumin 4.2 3.5 - 5.0 g/dL    Bilirubin Total 0.4 0.0 - 1.0 mg/dL    GFR Estimate >90 >60 mL/min/1.73m2      Comment:      As of July 11, 2021, eGFR is calculated by the CKD-EPI creatinine equation, without race adjustment. eGFR can be influenced by muscle mass, exercise, and diet. The reported eGFR is an estimation only and is only applicable if the renal function is stable.   Vitamin D Deficiency   Result Value Ref Range    Vitamin D, Total (25-Hydroxy) 37 30 - 80 ug/L    Narrative    Deficiency <10.0 ug/L  Insufficiency 10.0-29.9 ug/L  Sufficiency 30.0-80.0 ug/L  Toxicity (possible) >100.0 ug/L    HIV Antigen Antibody Combo   Result  Value Ref Range    HIV Antigen Antibody Combo Negative Negative   Hepatitis C antibody   Result Value Ref Range    Hepatitis C Antibody Negative Negative    Narrative    Assay performance characteristics have not been established for newborns, infants, children (<18 years) or populations of immunocompromised or immunosuppressed patients.    Wet prep - Clinic Collect   Result Value Ref Range    Trichomonas Absent Absent    Yeast Present (A) Absent    Clue Cells Absent Absent    WBCs/high power field 1+ (A) None   NEISSERIA GONORRHOEA PCR   Result Value Ref Range    Neisseria gonorrhoeae Negative Negative      Comment:      Negative for N. gonorrhoeae rRNA by transcription mediated amplification. A negative result by transcription mediated amplification does not preclude the presence of C. trachomatis infection because results are dependent on proper and adequate collection, absence of inhibitors and sufficient rRNA to be detected.   CHLAMYDIA TRACHOMATIS PCR   Result Value Ref Range    Chlamydia trachomatis Negative Negative      Comment:      A negative result by transcription mediated amplification does not preclude the presence of C. trachomatis infection because results are dependent on proper and adequate collection, absence of inhibitors and sufficient rRNA to be detected.   CBC with platelets and differential   Result Value Ref Range    WBC Count 7.8 4.0 - 11.0 10e3/uL    RBC Count 4.85 3.80 - 5.20 10e6/uL    Hemoglobin 14.2 11.7 - 15.7 g/dL    Hematocrit 41.5 35.0 - 47.0 %    MCV 86 78 - 100 fL    MCH 29.3 26.5 - 33.0 pg    MCHC 34.2 31.5 - 36.5 g/dL    RDW 12.9 10.0 - 15.0 %    Platelet Count 436 150 - 450 10e3/uL    % Neutrophils 61 %    % Lymphocytes 27 %    % Monocytes 7 %    % Eosinophils 4 %    % Basophils 0 %    % Immature Granulocytes 1 %    Absolute Neutrophils 4.8 1.6 - 8.3 10e3/uL    Absolute Lymphocytes 2.1 0.8 - 5.3 10e3/uL    Absolute Monocytes 0.5 0.0 - 1.3 10e3/uL    Absolute Eosinophils 0.3  0.0 - 0.7 10e3/uL    Absolute Basophils 0.0 0.0 - 0.2 10e3/uL    Absolute Immature Granulocytes 0.1 (H) <=0.0 10e3/uL       If you have any questions or concerns, please call the clinic at the number listed above.       Sincerely,      Liana Moyer MD

## 2021-09-29 LAB
DEPRECATED CALCIDIOL+CALCIFEROL SERPL-MC: 37 UG/L (ref 30–80)
HCV AB SERPL QL IA: NEGATIVE
HIV 1+2 AB+HIV1 P24 AG SERPL QL IA: NEGATIVE

## 2021-09-29 ASSESSMENT — PATIENT HEALTH QUESTIONNAIRE - PHQ9: SUM OF ALL RESPONSES TO PHQ QUESTIONS 1-9: 1

## 2021-09-29 ASSESSMENT — ASTHMA QUESTIONNAIRES: ACT_TOTALSCORE: 22

## 2021-09-30 DIAGNOSIS — B37.31 CANDIDIASIS OF VAGINA: Primary | ICD-10-CM

## 2021-09-30 LAB
C TRACH DNA SPEC QL NAA+PROBE: NEGATIVE
N GONORRHOEA DNA SPEC QL NAA+PROBE: NEGATIVE

## 2021-09-30 RX ORDER — FLUCONAZOLE 150 MG/1
150 TABLET ORAL ONCE
Qty: 1 TABLET | Refills: 0 | Status: SHIPPED | OUTPATIENT
Start: 2021-09-30 | End: 2021-09-30

## 2021-09-30 NOTE — RESULT ENCOUNTER NOTE
Please notify patient her vaginal swab showed a yeast infection. I sent an Rx to her pharmacy for diflucan 1 tab once to treat this.   I will also mail her results for all the other tests which were normal.       Dr. Liana Moyer  9/30/2021

## 2021-10-01 ENCOUNTER — TELEPHONE (OUTPATIENT)
Dept: FAMILY MEDICINE | Facility: CLINIC | Age: 36
End: 2021-10-01

## 2021-10-01 LAB
HUMAN PAPILLOMA VIRUS 16 DNA: NEGATIVE
HUMAN PAPILLOMA VIRUS 18 DNA: NEGATIVE
HUMAN PAPILLOMA VIRUS FINAL DIAGNOSIS: ABNORMAL
HUMAN PAPILLOMA VIRUS OTHER HR: POSITIVE

## 2021-10-01 NOTE — TELEPHONE ENCOUNTER
Patient returns RN call from result note 9/28 (TC was not able to edit - no access).   TC relay message provider's message. The patient verbalizes understanding of provider/CSS instructions for follow-up and continued care per provider message. Pt has no further questions.

## 2021-10-05 ENCOUNTER — PATIENT OUTREACH (OUTPATIENT)
Dept: FAMILY MEDICINE | Facility: CLINIC | Age: 36
End: 2021-10-05

## 2021-10-05 DIAGNOSIS — R87.610 ASCUS WITH POSITIVE HIGH RISK HPV CERVICAL: ICD-10-CM

## 2021-10-05 DIAGNOSIS — R87.810 ASCUS WITH POSITIVE HIGH RISK HPV CERVICAL: ICD-10-CM

## 2021-10-05 LAB
BKR LAB AP GYN ADEQUACY: NORMAL
BKR LAB AP GYN INTERPRETATION: NORMAL
BKR LAB AP GYN OTHER FINDINGS: NORMAL
BKR LAB AP HPV REFLEX: NORMAL
BKR LAB AP LMP: NORMAL
BKR LAB AP PREVIOUS ABNL DX: NORMAL
BKR LAB AP PREVIOUS ABNORMAL: NORMAL
PATH REPORT.COMMENTS IMP SPEC: NORMAL
PATH REPORT.RELEVANT HX SPEC: NORMAL

## 2021-11-30 ENCOUNTER — PATIENT OUTREACH (OUTPATIENT)
Dept: FAMILY MEDICINE | Facility: CLINIC | Age: 36
End: 2021-11-30

## 2021-11-30 DIAGNOSIS — R87.810 ASCUS WITH POSITIVE HIGH RISK HPV CERVICAL: ICD-10-CM

## 2021-11-30 DIAGNOSIS — R87.610 ASCUS WITH POSITIVE HIGH RISK HPV CERVICAL: ICD-10-CM

## 2021-12-27 ENCOUNTER — VIRTUAL VISIT (OUTPATIENT)
Dept: FAMILY MEDICINE | Facility: CLINIC | Age: 36
End: 2021-12-27
Payer: MEDICARE

## 2021-12-27 DIAGNOSIS — B97.7 HIGH RISK HUMAN PAPILLOMA VIRUS (HPV) INFECTION OF CERVIX: ICD-10-CM

## 2021-12-27 DIAGNOSIS — F41.1 ANXIETY STATE: ICD-10-CM

## 2021-12-27 DIAGNOSIS — F41.1 ANXIETY STATE: Primary | ICD-10-CM

## 2021-12-27 DIAGNOSIS — N72 HIGH RISK HUMAN PAPILLOMA VIRUS (HPV) INFECTION OF CERVIX: ICD-10-CM

## 2021-12-27 PROCEDURE — 99213 OFFICE O/P EST LOW 20 MIN: CPT | Mod: 95 | Performed by: FAMILY MEDICINE

## 2021-12-27 RX ORDER — FLUOXETINE 10 MG/1
10 CAPSULE ORAL DAILY
Qty: 90 CAPSULE | Refills: 3 | Status: SHIPPED | OUTPATIENT
Start: 2021-12-27 | End: 2022-09-29

## 2021-12-27 NOTE — PROGRESS NOTES
"The patient has been notified of following:     \"This video visit will be conducted via a call between you and your physician/provider. We have found that certain health care needs can be provided without the need for an in-person physical exam.  This service lets us provide the care you need with a video conversation.  If a prescription is necessary we can send it directly to your pharmacy.  If lab work is needed we can place an order for that and you can then stop by our lab to have the test done at a later time.    Video visits are billed at different rates depending on your insurance coverage.  Please reach out to your insurance provider with any questions.    If during the course of the call the physician/provider feels a video visit is not appropriate, you will not be charged for this service.\"    Patient has given verbal consent for Video visit? Yes  How would you like to obtain your AVS? MyChart  If you are dropped from the video visit, the video invite should be resent to: Send to e-mail at: [email or web address]  Will anyone else be joining your video visit? No     Sharon was seen today for medication refill and colonoscopy.    Diagnoses and all orders for this visit:    Anxiety: Doing well previously on selective serotonin reuptake inhibitor- she has been out since October- refilled for 1 year. Tolerated well without side effects.   -     FLUoxetine (PROZAC) 10 MG capsule; Take 1 capsule (10 mg) by mouth daily      High risk human papilloma virus (HPV) infection of cervix: other high risk HPV on pap smear. Plan for colposcopy. Reviewed options for colposcopy- she prefers to have this done at [place]- placed message to  staff to call patient to assist with scheduling.        Subjective   Sharon is a 36 year old who presents for the following health issues    HPI   Been out of her anxiety medication since October  She had recent visit with PCP 9/2021  She was doing well on fluoxetine and is " interested in restarting  She has history of other high risk HPV- had colposcopy that was normal and last pap smear showed persistent other high risk HPV- she is due for another colposcopy but is unsure how to set this up      Review of Systems   Negative except as noted above in HPI      Objective           Vitals:  No vitals were obtained today due to virtual visit.    Physical Exam   GENERAL: Healthy, alert and no distress  PSYCH: Mentation appears normal, affect normal/bright, normal speech        Video-Visit Details    Type of service:  Video Visit  Video start time: 11:44  Video End Time: 11:52    Originating Location (pt. Location): Home    Distant Location (provider location):  [facility]     Platform used for Video Visit: Lucretia

## 2022-05-10 ENCOUNTER — OFFICE VISIT (OUTPATIENT)
Dept: FAMILY MEDICINE | Facility: CLINIC | Age: 37
End: 2022-05-10
Payer: MEDICARE

## 2022-05-10 VITALS
TEMPERATURE: 97.9 F | BODY MASS INDEX: 34.39 KG/M2 | OXYGEN SATURATION: 96 % | HEIGHT: 66 IN | WEIGHT: 214 LBS | HEART RATE: 74 BPM | DIASTOLIC BLOOD PRESSURE: 64 MMHG | RESPIRATION RATE: 20 BRPM | SYSTOLIC BLOOD PRESSURE: 100 MMHG

## 2022-05-10 DIAGNOSIS — R87.810 CERVICAL HIGH RISK HPV (HUMAN PAPILLOMAVIRUS) TEST POSITIVE: Primary | ICD-10-CM

## 2022-05-10 LAB — HCG UR QL: NEGATIVE

## 2022-05-10 PROCEDURE — 81025 URINE PREGNANCY TEST: CPT | Performed by: FAMILY MEDICINE

## 2022-05-10 PROCEDURE — 57452 EXAM OF CERVIX W/SCOPE: CPT | Performed by: FAMILY MEDICINE

## 2022-05-10 ASSESSMENT — ASTHMA QUESTIONNAIRES
ACT_TOTALSCORE: 23
QUESTION_2 LAST FOUR WEEKS HOW OFTEN HAVE YOU HAD SHORTNESS OF BREATH: ONCE OR TWICE A WEEK
QUESTION_3 LAST FOUR WEEKS HOW OFTEN DID YOUR ASTHMA SYMPTOMS (WHEEZING, COUGHING, SHORTNESS OF BREATH, CHEST TIGHTNESS OR PAIN) WAKE YOU UP AT NIGHT OR EARLIER THAN USUAL IN THE MORNING: NOT AT ALL
ACT_TOTALSCORE: 23
QUESTION_4 LAST FOUR WEEKS HOW OFTEN HAVE YOU USED YOUR RESCUE INHALER OR NEBULIZER MEDICATION (SUCH AS ALBUTEROL): ONCE A WEEK OR LESS
QUESTION_1 LAST FOUR WEEKS HOW MUCH OF THE TIME DID YOUR ASTHMA KEEP YOU FROM GETTING AS MUCH DONE AT WORK, SCHOOL OR AT HOME: NONE OF THE TIME
QUESTION_5 LAST FOUR WEEKS HOW WOULD YOU RATE YOUR ASTHMA CONTROL: COMPLETELY CONTROLLED

## 2022-05-10 NOTE — PROGRESS NOTES
Colposcopy Procedure Note    Sharon Moraes is a 36 year old female is here today for a Colposcopy.    Indications:   Positive High Risk HPV    Relevant history:  7/18/16:  Pap: NIL  8/20/19 Pap:  ASCUS, +HR HPV, not 16/18  9/30/19 [place]-  Epithelial atypia with koilocytosis  10/1/20 Pap NIL, +HR HPV, not 16/18. Plan 1 yr co-test   9/28/21 Pap NIL, +HR HPV (not 16/18). Plan: colposcopy      LMP: No LMP recorded. Patient has had an implant.   Contraception: Nexplanon    Tobacco use:   reports that she has never smoked. She has never used smokeless tobacco.      Procedure Details     The reason for procedure is explained and informed consent obtained.    Patient assumed the dorsal lithotomy position.  External genitalia grossly inspected and found to be unremarkable.  Sterile speculum insered.  Cervix observed with and without magnification.   The squamocolumnar junction (SCJ) was seen in its entirety    Findings before application of acetic acid:  Normal  Pap Smear was obtained today.  Acetic acid was applied.  Findings: slight brief acetowhite epithelium at os  Biopsies were not taken:    Endocervial sampling was notperformed.  Hemostatis was not an issue (no bleeding)  The cervix and vaginal were inspected  The speculum was withdrawn and patient observed to sit up without syncope or prescyncope.  She tolerated the procedure well.  EBL:0 ml    Specimens: Pap Smear    Complications: none.    Colposcopic Impression:    Borderline adequate colposcopy exam today  Low grade abnormality at external cervical os (slight acetowhite epithelium, not seen in it's entirety but sampled with Pap Smear including cytobrush)       Plan:  Pap done today, as last one was 8 months ago.  If Pap normal, ascus, or LSIL would plan cotesting in 1 year.  If HSIL or other, would consider repeat colpo sooner.    ADDENDUM:  Pap smear was not obtained correctly (collection instruments not included with the sample).  Will need to re-collect.

## 2022-05-25 ENCOUNTER — OFFICE VISIT (OUTPATIENT)
Dept: FAMILY MEDICINE | Facility: CLINIC | Age: 37
End: 2022-05-25
Payer: MEDICARE

## 2022-05-25 VITALS
SYSTOLIC BLOOD PRESSURE: 120 MMHG | DIASTOLIC BLOOD PRESSURE: 60 MMHG | WEIGHT: 217 LBS | RESPIRATION RATE: 20 BRPM | HEART RATE: 83 BPM | OXYGEN SATURATION: 98 % | BODY MASS INDEX: 34.87 KG/M2 | HEIGHT: 66 IN | TEMPERATURE: 98.9 F

## 2022-05-25 DIAGNOSIS — R87.810 CERVICAL HIGH RISK HPV (HUMAN PAPILLOMAVIRUS) TEST POSITIVE: Primary | ICD-10-CM

## 2022-05-25 PROCEDURE — 87624 HPV HI-RISK TYP POOLED RSLT: CPT | Performed by: FAMILY MEDICINE

## 2022-05-25 PROCEDURE — G0124 SCREEN C/V THIN LAYER BY MD: HCPCS | Performed by: FAMILY MEDICINE

## 2022-05-25 PROCEDURE — G0123 SCREEN CERV/VAG THIN LAYER: HCPCS | Performed by: FAMILY MEDICINE

## 2022-05-25 NOTE — PROGRESS NOTES
"ASSESSMENT/PLAN:    Cervical high risk HPV (human papillomavirus) test positive  Repeat screening Pap smear/cotesting obtained today as last Pap smear was about 9 months ago (normal with high risk HPV 9/28/2021) and colposcopy on 5/10/2022 did not have SCJ fully visualized.  - Pap Screen with HPV - recommended age 30 - 65 years       Return in about 7 weeks (around 7/11/2022) for with your regular doctor, as already scheduled.  She is wondering about immunization update, that could be addressed at the next visit.    No physician charge for today's visit as it was only done to recollect the Pap smear, which I had not done properly last visit.    SUBJECTIVE:  Sharon Moraes is a 36 year old female here for Redo Pap     History of Present Illness       Reason for visit:  Follow up for pap smear    She eats 2-3 servings of fruits and vegetables daily.She consumes 2 sweetened beverage(s) daily.She exercises with enough effort to increase her heart rate 10 to 19 minutes per day.  She exercises with enough effort to increase her heart rate 3 or less days per week.   She is taking medications regularly.       Sharon is here for repeat Pap smear collection.  She had a colposcopy 2 weeks ago that showed normal colposcopy exam but a Pap smear had been obtained to reevaluate cytology given it had been more than 6 months since her previous Pap smear.  I had not labeled the specimen properly and so it was unable to be processed.  She is here to have it recollected today.    She thinks her period may be coming any moment/today now.    OBJECTIVE:  :  /60   Pulse 83   Temp 98.9  F (37.2  C) (Oral)   Resp 20   Ht 1.676 m (5' 6\")   Wt 98.4 kg (217 lb)   SpO2 98%   BMI 35.02 kg/m          Gen:  A&A, NAD  : Normal female external genitalia.  Does have a large skin tag on her right medial thigh.  Speculum exam is normal with a small amount of whitish vaginal discharge without odor.  Pap smear is obtained.        "

## 2022-06-02 ENCOUNTER — PATIENT OUTREACH (OUTPATIENT)
Dept: FAMILY MEDICINE | Facility: CLINIC | Age: 37
End: 2022-06-02
Payer: MEDICARE

## 2022-06-02 DIAGNOSIS — R87.810 CERVICAL HIGH RISK HPV (HUMAN PAPILLOMAVIRUS) TEST POSITIVE: ICD-10-CM

## 2022-06-02 NOTE — TELEPHONE ENCOUNTER
05/10/22 [place] Borderline adequate colposcopy exam today, no bx's taken. Plan  Pap done today, as last one was 8 months ago.  If Pap normal, ascus, or LSIL would plan cotesting in 1 year. If HSIL or other, would consider repeat colpo sooner.  05/25/22 Pap in process, + HR HPV (not 16 or 18)

## 2022-06-03 ENCOUNTER — PATIENT OUTREACH (OUTPATIENT)
Dept: FAMILY MEDICINE | Facility: CLINIC | Age: 37
End: 2022-06-03
Payer: MEDICARE

## 2022-06-03 LAB
BKR LAB AP GYN ADEQUACY: ABNORMAL
BKR LAB AP GYN INTERPRETATION: ABNORMAL
BKR LAB AP HPV REFLEX: ABNORMAL
BKR LAB AP LMP: ABNORMAL
BKR LAB AP PREVIOUS ABNL DX: ABNORMAL
BKR LAB AP PREVIOUS ABNORMAL: ABNORMAL
PATH REPORT.COMMENTS IMP SPEC: ABNORMAL
PATH REPORT.COMMENTS IMP SPEC: ABNORMAL
PATH REPORT.RELEVANT HX SPEC: ABNORMAL

## 2022-07-11 ENCOUNTER — OFFICE VISIT (OUTPATIENT)
Dept: FAMILY MEDICINE | Facility: CLINIC | Age: 37
End: 2022-07-11
Payer: MEDICARE

## 2022-07-11 VITALS
RESPIRATION RATE: 16 BRPM | TEMPERATURE: 98.2 F | HEART RATE: 75 BPM | DIASTOLIC BLOOD PRESSURE: 62 MMHG | OXYGEN SATURATION: 98 % | SYSTOLIC BLOOD PRESSURE: 108 MMHG | WEIGHT: 216.5 LBS | BODY MASS INDEX: 34.94 KG/M2

## 2022-07-11 DIAGNOSIS — F79 UNSPECIFIED INTELLECTUAL DISABILITIES: ICD-10-CM

## 2022-07-11 DIAGNOSIS — Z23 NEED FOR VACCINATION: ICD-10-CM

## 2022-07-11 DIAGNOSIS — Z97.5 NEXPLANON IN PLACE: ICD-10-CM

## 2022-07-11 DIAGNOSIS — E66.9 OBESITY (BMI 35.0-39.9 WITHOUT COMORBIDITY): ICD-10-CM

## 2022-07-11 DIAGNOSIS — F41.1 ANXIETY STATE: ICD-10-CM

## 2022-07-11 DIAGNOSIS — F90.8 ATTENTION DEFICIT HYPERACTIVITY DISORDER (ADHD), OTHER TYPE: ICD-10-CM

## 2022-07-11 DIAGNOSIS — Z23 HIGH PRIORITY FOR COVID-19 VACCINATION: ICD-10-CM

## 2022-07-11 DIAGNOSIS — J45.30 MILD PERSISTENT ASTHMA, UNCOMPLICATED: Primary | ICD-10-CM

## 2022-07-11 PROCEDURE — 90677 PCV20 VACCINE IM: CPT | Performed by: FAMILY MEDICINE

## 2022-07-11 PROCEDURE — G0009 ADMIN PNEUMOCOCCAL VACCINE: HCPCS | Performed by: FAMILY MEDICINE

## 2022-07-11 PROCEDURE — 0054A COVID-19,PF,PFIZER (12+ YRS): CPT | Performed by: FAMILY MEDICINE

## 2022-07-11 PROCEDURE — 91305 COVID-19,PF,PFIZER (12+ YRS): CPT | Performed by: FAMILY MEDICINE

## 2022-07-11 PROCEDURE — 99213 OFFICE O/P EST LOW 20 MIN: CPT | Mod: 25 | Performed by: FAMILY MEDICINE

## 2022-07-11 NOTE — PATIENT INSTRUCTIONS
My Asthma Action Plan    Name: Sharon Moraes   YOB: 1985  Date: 7/11/2022   My doctor: Liana Moyer MD   My clinic: [facility]        My Control Medicine: Budesonide (Pulmicort) nebulizer solution -  0.25mg/2ml prn  My Rescue Medicine: Albuterol (Proair/Ventolin/Proventil HFA) 2-4 puffs EVERY 4 HOURS as needed. Use a spacer if recommended by your provider.  My Oral Steroid Medicine: see MD My Asthma Severity:   Mild Persistent  Know your asthma triggers: pollens               GREEN ZONE   Good Control  I feel good  No cough or wheeze  Can work, sleep and play without asthma symptoms       Take your asthma control medicine every day.     If exercise triggers your asthma, take your rescue medication  15 minutes before exercise or sports, and  During exercise if you have asthma symptoms  Spacer to use with inhaler: If you have a spacer, make sure to use it with your inhaler             YELLOW ZONE Getting Worse  I have ANY of these:  I do not feel good  Cough or wheeze  Chest feels tight  Wake up at night   Keep taking your Green Zone medications  Start taking your rescue medicine:  every 20 minutes for up to 1 hour. Then every 4 hours for 24-48 hours.  If you stay in the Yellow Zone for more than 12-24 hours, contact your doctor.  If you do not return to the Green Zone in 12-24 hours or you get worse, start taking your oral steroid medicine if prescribed by your provider.           RED ZONE Medical Alert - Get Help  I have ANY of these:  I feel awful  Medicine is not helping  Breathing getting harder  Trouble walking or talking  Nose opens wide to breathe       Take your rescue medicine NOW  If your provider has prescribed an oral steroid medicine, start taking it NOW  Call your doctor NOW  If you are still in the Red Zone after 20 minutes and you have not reached your doctor:  Take your rescue medicine again and  Call 911 or go to the emergency room right away    See your  regular doctor within 2 weeks of an Emergency Room or Urgent Care visit for follow-up treatment.          Annual Reminders:  Meet with Asthma Educator,  Flu Shot in the Fall, consider Pneumonia Vaccination for patients with asthma (aged 19 and older).    Pharmacy: [id number] [place], [place] - [address] AT [facility]    Electronically signed by Liana Moyer MD   Date: 07/11/22                      Asthma Triggers  How To Control Things That Make Your Asthma Worse    Triggers are things that make your asthma worse.  Look at the list below to help you find your triggers and what you can do about them.  You can help prevent asthma flare-ups by staying away from your triggers.      Trigger                                                          What you can do   Cigarette Smoke  Tobacco smoke can make asthma worse. Do not allow smoking in your home, car or around you.  Be sure no one smokes at a child s day care or school.  If you smoke, ask your health care provider for ways to help you quit.  Ask family members to quit too.  Ask your health care provider for a referral to Quit Plan to help you quit smoking, or call 6-118-948-PLAN.     Colds, Flu, Bronchitis  These are common triggers of asthma. Wash your hands often.  Don t touch your eyes, nose or mouth.  Get a flu shot every year.     Dust Mites  These are tiny bugs that live in cloth or carpet. They are too small to see. Wash sheets and blankets in hot water every week.   Encase pillows and mattress in dust mite proof covers.  Avoid having carpet if you can. If you have carpet, vacuum weekly.   Use a dust mask and HEPA vacuum.   Pollen and Outdoor Mold  Some people are allergic to trees, grass, or weed pollen, or molds. Try to keep your windows closed.  Limit time out doors when pollen count is high.   Ask you health care provider about taking medicine during allergy season.     Animal Dander  Some people are allergic  to skin flakes, urine or saliva from pets with fur or feathers. Keep pets with fur or feathers out of your home.    If you can t keep the pet outdoors, then keep the pet out of your bedroom.  Keep the bedroom door closed.  Keep pets off cloth furniture and away from stuffed toys.     Mice, Rats, and Cockroaches   Some people are allergic to the waste from these pests.   Cover food and garbage.  Clean up spills and food crumbs.  Store grease in the refrigerator.   Keep food out of the bedroom.   Indoor Mold  This can be a trigger if your home has high moisture. Fix leaking faucets, pipes, or other sources of water.   Clean moldy surfaces.  Dehumidify basement if it is damp and smelly.   Smoke, Strong Odors, and Sprays  These can reduce air quality. Stay away from strong odors and sprays, such as perfume, powder, hair spray, paints, smoke incense, paint, cleaning products, candles and new carpet.   Exercise or Sports  Some people with asthma have this trigger. Be active!  Ask your doctor about taking medicine before sports or exercise to prevent symptoms.    Warm up for 5-10 minutes before and after sports or exercise.     Other Triggers of Asthma  Cold air:  Cover your nose and mouth with a scarf.  Sometimes laughing or crying can be a trigger.  Some medicines and food can trigger asthma.

## 2022-07-11 NOTE — PROGRESS NOTES
"Assessment & Plan     Mild persistent asthma, uncomplicated  Well controlled  ACT and action plan completed today     Anxiety  Doing well with fluoxetine     High priority for COVID-19 vaccination  Booster #4 today   - COVID-19,PF,PFIZER (12+ Yrs GRAY LABEL)    Need for vaccination  - PNEUMOCOCCAL 20 VALENT CONJUGATE (PREVNAR 20)  Discussed given h/o asthma  but deferred for her physical     Attention deficit hyperactivity disorder (ADHD), other type  This is a known issue that I take into account for medical decisions but no current exacerbation or new concerns.    Obesity   BMI 34.94%   Working on diet and exercise    nexplanon in place  Due to replace on 8/26/22    Borderline Intellectual Functioning  This is a known issue that I take into account for medical decisions but no current exacerbation or new concerns.       BMI:   Estimated body mass index is 34.94 kg/m  as calculated from the following:    Height as of 5/25/22: 5' 6\" (1.676 m).    Weight as of this encounter: 216 lb 8 oz (98.2 kg).   Weight management plan: Discussed healthy diet and exercise guidelines        Return in about 3 months (around 10/11/2022) for Routine preventive, for med check.   RTC for nexplanon replacement by 8/26/22    Liana Moyer MD  [facility]     Sharon Moraes is a 36 year old female who presents today for the following   health issues: Asthma    Answers for HPI/ROS submitted by the patient on 7/4/2022  What is the reason for your visit today? : Asthma wellness check up  How many servings of fruits and vegetables do you eat daily?: 2-3  On average, how many sweetened beverages do you drink each day (Examples: soda, juice, sweet tea, etc.  Do NOT count diet or artificially sweetened beverages)?: 3  How many minutes a day do you exercise enough to make your heart beat faster?: 10 to 19  How many days a week do you exercise enough to make your heart beat faster?: 3 or less  How many days " per week do you miss taking your medication?: 0      Mild persistent asthma   budesonide in nebulizer as needed, ventolin prn (using less than 1 x per week)  ACT 22 on 9/28/21  ACT 22 today     Anxiety -   Fluoxetine is helping  PHQ2 = 0      ADHD -   Not on meds, never been on meds    covid vaccines - x 3, wants booster  She is asking about Pneumonia shot given asthma    Overweight -   Working on weight loss  BMI 34.94 and Wt 216# today   Prior weight 205# on 9/2021 and 217# on 5/25/22    nexplanon due to replace 8/26/22  Inserted by 8/26/19    Review of Systems     Please see above.  The rest of the review of systems are negative for all systems.    Current Outpatient Medications   Medication Instructions     albuterol (PROAIR HFA/PROVENTIL HFA/VENTOLIN HFA) 108 (90 Base) MCG/ACT inhaler [ALBUTEROL (PROAIR HFA;PROVENTIL HFA;VENTOLIN HFA) 90 MCG/ACTUATION INHALER] INHALE 2 PUFFS BY MOUTH FOUR TIMES DAILY AS NEEDED FOR WHEEZING WITH SPACER     budesonide (PULMICORT) 0.25 mg/2 mL nebulizer solution [BUDESONIDE (PULMICORT) 0.25 MG/2 ML NEBULIZER SOLUTION] INHALE 1 VIAL VIA NEBULIZER TWICE DAILY. MIX WITH ALBUTEROL     etonogestreL (NEXPLANON) 68 mg Impl implant [ETONOGESTREL (NEXPLANON) 68 MG IMPL IMPLANT] Inserted 8/26/2019     FLUoxetine (PROZAC) 10 mg, Oral, DAILY         Objective   Vitals:    07/11/22 1005   BP: 108/62   Pulse: 75   Resp: 16   Temp: 98.2  F (36.8  C)   TempSrc: Temporal   SpO2: 98%   Weight: 98.2 kg (216 lb 8 oz)       Body mass index is 34.94 kg/m .    Physical Exam    OBJECTIVE:  Vitals listed above within normal limits  General appearance: well groomed, pleasant, well hydrated, nontoxic appearing  ENT: PERRL, throat clear  Neck: neck supple, no lymphadenopathy, no thyromegaly  Lungs: lungs clear to auscultation bilaterally, no wheezes or rhonchi  Heart: regular rate and rhythm, no murmurs, rubs or gallops  Abdomen: soft, nontender  Neuro: no focal deficits, CN II-XII grossly intact, alert and  oriented  Psych:  mood stable, appears to have good insight and judgment

## 2022-08-19 ENCOUNTER — OFFICE VISIT (OUTPATIENT)
Dept: FAMILY MEDICINE | Facility: CLINIC | Age: 37
End: 2022-08-19
Payer: COMMERCIAL

## 2022-08-19 VITALS
WEIGHT: 212 LBS | OXYGEN SATURATION: 99 % | TEMPERATURE: 98.2 F | SYSTOLIC BLOOD PRESSURE: 120 MMHG | DIASTOLIC BLOOD PRESSURE: 70 MMHG | HEIGHT: 66 IN | RESPIRATION RATE: 20 BRPM | BODY MASS INDEX: 34.07 KG/M2 | HEART RATE: 88 BPM

## 2022-08-19 DIAGNOSIS — Z30.017 INSERTION OF IMPLANTABLE SUBDERMAL CONTRACEPTIVE: ICD-10-CM

## 2022-08-19 DIAGNOSIS — Z30.46 ENCOUNTER FOR REMOVAL AND REINSERTION OF NEXPLANON: Primary | ICD-10-CM

## 2022-08-19 DIAGNOSIS — Z97.5 NEXPLANON IN PLACE: ICD-10-CM

## 2022-08-19 PROCEDURE — 11983 REMOVE/INSERT DRUG IMPLANT: CPT | Performed by: FAMILY MEDICINE

## 2022-08-19 NOTE — PROGRESS NOTES
"  Sharon was seen today for nexplanon removal/insertion .    Diagnoses and all orders for this visit:    Encounter for removal and reinsertion of Nexplanon  Insertion of implantable subdermal contraceptive: Removed and reinserted- see procedure note below. No complications.  -     etonogestrel (NEXPLANON) subdermal implant 68 mg  -     INSERTION NON-BIODEGRADABLE DRUG DELIVERY IMPLANT        Subjective   Sharon is a 37 year old, presenting for the following health issues:  Nexplanon Removal/Insertion         History of Present Illness       Reason for visit:  Birth control    She eats 2-3 servings of fruits and vegetables daily.She consumes 2 sweetened beverage(s) daily.She exercises with enough effort to increase her heart rate 9 or less minutes per day.  She exercises with enough effort to increase her heart rate 3 or less days per week.   She is taking medications regularly.       Has nexplanon in place  Due for removal and reinsertion  No concerns      Review of Systems         Objective    /70   Pulse 88   Temp 98.2  F (36.8  C) (Oral)   Resp 20   Ht 1.676 m (5' 6\")   Wt 96.2 kg (212 lb)   LMP 08/17/2022 (Exact Date)   SpO2 99%   Breastfeeding No   BMI 34.22 kg/m    Body mass index is 34.22 kg/m .  Physical Exam   Gen: well appearing  Ext: nexplanon palpated in left upper arm      PROCEDURE NOTE: Nexplanon Removal and Reinsertion    After cleansing left arm above the elbow, 2 mL of 1% Lidocaine was administered along the distal edge of palpated Nexplanon implant. Insertion site cleansed with iodine. 0.25 cm incision was made at tip of Nexplanon with 11 blade scalpel. The tip was visualized and was grasped with pickups. Nexplanon was easily removed. A new nexplanon was inserted in the same location.     Bleeding was minimal and a pressure dressing was applied with instructions to leave this in place for 24 hours. Pt was instructed to observe for signs/symptoms of any infection (localized tenderness, " pain, inflammation) or excessive bruising.  No apparent distress at completion of procedure. No complications.    Vijaya Stern MD                      .  ..

## 2022-08-21 PROBLEM — Z97.5 NEXPLANON IN PLACE: Status: ACTIVE | Noted: 2022-08-21

## 2022-09-25 ENCOUNTER — HEALTH MAINTENANCE LETTER (OUTPATIENT)
Age: 37
End: 2022-09-25

## 2022-09-29 DIAGNOSIS — Z76.0 ENCOUNTER FOR MEDICATION REFILL: Primary | ICD-10-CM

## 2022-09-29 DIAGNOSIS — F41.1 ANXIETY STATE: ICD-10-CM

## 2022-09-29 RX ORDER — FLUOXETINE 10 MG/1
CAPSULE ORAL
Qty: 90 CAPSULE | Refills: 3 | Status: SHIPPED | OUTPATIENT
Start: 2022-09-29 | End: 2023-09-28

## 2022-11-14 ASSESSMENT — ENCOUNTER SYMPTOMS
HEMATURIA: 0
DIARRHEA: 0
NAUSEA: 0
PALPITATIONS: 0
HEARTBURN: 0
JOINT SWELLING: 0
HEADACHES: 0
EYE PAIN: 0
HEMATOCHEZIA: 0
ABDOMINAL PAIN: 0
ARTHRALGIAS: 0
CHILLS: 0
FEVER: 0
DIZZINESS: 0
CONSTIPATION: 0
PARESTHESIAS: 0
SORE THROAT: 0
BREAST MASS: 0
MYALGIAS: 0
NERVOUS/ANXIOUS: 1
SHORTNESS OF BREATH: 0
FREQUENCY: 0
WEAKNESS: 0
COUGH: 0
DYSURIA: 0

## 2022-11-14 ASSESSMENT — ASTHMA QUESTIONNAIRES
QUESTION_5 LAST FOUR WEEKS HOW WOULD YOU RATE YOUR ASTHMA CONTROL: WELL CONTROLLED
ACT_TOTALSCORE: 23
QUESTION_1 LAST FOUR WEEKS HOW MUCH OF THE TIME DID YOUR ASTHMA KEEP YOU FROM GETTING AS MUCH DONE AT WORK, SCHOOL OR AT HOME: NONE OF THE TIME
QUESTION_4 LAST FOUR WEEKS HOW OFTEN HAVE YOU USED YOUR RESCUE INHALER OR NEBULIZER MEDICATION (SUCH AS ALBUTEROL): NOT AT ALL
QUESTION_3 LAST FOUR WEEKS HOW OFTEN DID YOUR ASTHMA SYMPTOMS (WHEEZING, COUGHING, SHORTNESS OF BREATH, CHEST TIGHTNESS OR PAIN) WAKE YOU UP AT NIGHT OR EARLIER THAN USUAL IN THE MORNING: NOT AT ALL
ACT_TOTALSCORE: 23
QUESTION_2 LAST FOUR WEEKS HOW OFTEN HAVE YOU HAD SHORTNESS OF BREATH: ONCE OR TWICE A WEEK

## 2022-11-14 ASSESSMENT — ACTIVITIES OF DAILY LIVING (ADL): CURRENT_FUNCTION: NO ASSISTANCE NEEDED

## 2022-11-15 ENCOUNTER — OFFICE VISIT (OUTPATIENT)
Dept: FAMILY MEDICINE | Facility: CLINIC | Age: 37
End: 2022-11-15
Payer: MEDICARE

## 2022-11-15 VITALS
WEIGHT: 213.12 LBS | SYSTOLIC BLOOD PRESSURE: 107 MMHG | DIASTOLIC BLOOD PRESSURE: 65 MMHG | TEMPERATURE: 98.4 F | HEIGHT: 66 IN | RESPIRATION RATE: 19 BRPM | HEART RATE: 89 BPM | BODY MASS INDEX: 34.25 KG/M2 | OXYGEN SATURATION: 96 %

## 2022-11-15 DIAGNOSIS — R87.810 CERVICAL HIGH RISK HPV (HUMAN PAPILLOMAVIRUS) TEST POSITIVE: ICD-10-CM

## 2022-11-15 DIAGNOSIS — R41.83 BORDERLINE INTELLECTUAL FUNCTIONING: ICD-10-CM

## 2022-11-15 DIAGNOSIS — F90.8 ATTENTION DEFICIT HYPERACTIVITY DISORDER (ADHD), OTHER TYPE: ICD-10-CM

## 2022-11-15 DIAGNOSIS — J45.30 MILD PERSISTENT ASTHMA, UNCOMPLICATED: ICD-10-CM

## 2022-11-15 DIAGNOSIS — Z00.00 ANNUAL PHYSICAL EXAM: Primary | ICD-10-CM

## 2022-11-15 DIAGNOSIS — Z97.5 NEXPLANON IN PLACE: ICD-10-CM

## 2022-11-15 PROCEDURE — 91312 COVID-19,PF,PFIZER BOOSTER BIVALENT: CPT | Performed by: FAMILY MEDICINE

## 2022-11-15 PROCEDURE — 0124A COVID-19,PF,PFIZER BOOSTER BIVALENT: CPT | Performed by: FAMILY MEDICINE

## 2022-11-15 PROCEDURE — 99395 PREV VISIT EST AGE 18-39: CPT | Mod: 25 | Performed by: FAMILY MEDICINE

## 2022-11-15 ASSESSMENT — ACTIVITIES OF DAILY LIVING (ADL): CURRENT_FUNCTION: NO ASSISTANCE NEEDED

## 2022-11-15 NOTE — PROGRESS NOTES
"   SUBJECTIVE:   CC: Sharon is an 37 year old who presents for preventive health visit.   Here for physical, no specific concerns.   Denied being sexually active. LMP last week. On Nexplanon.  H/O ADHA, not on med.   Lives alone, working at [employer].   Last PAP in 5/2022, LGSIL with Positive HPV.  Wants COVID Booster.    Healthy Habits:     In general, how would you rate your overall health?  Good    Frequency of exercise:  2-3 days/week    Duration of exercise:  Less than 15 minutes    Do you usually eat at least 4 servings of fruit and vegetables a day, include whole grains    & fiber and avoid regularly eating high fat or \"junk\" foods?  No    Taking medications regularly:  Yes    Medication side effects:  None    Ability to successfully perform activities of daily living:  No assistance needed    Home Safety:  No safety concerns identified    Hearing Impairment:  No hearing concerns    In the past 6 months, have you been bothered by leaking of urine?  No    In general, how would you rate your overall mental or emotional health?  Good      PHQ-2 Total Score: 0    Additional concerns today:  No      Today's PHQ-2 Score:   PHQ-2 ( 1999 Pfizer) 11/14/2022   Q1: Little interest or pleasure in doing things 0   Q2: Feeling down, depressed or hopeless 0   PHQ-2 Score 0   PHQ-2 Total Score (12-17 Years)- Positive if 3 or more points; Administer PHQ-A if positive -   Q1: Little interest or pleasure in doing things Not at all   Q2: Feeling down, depressed or hopeless Not at all   PHQ-2 Score 0       Abuse: Current or Past (Physical, Sexual or Emotional) - No  Do you feel safe in your environment? Yes        Social History     Tobacco Use     Smoking status: Never     Smokeless tobacco: Never   Substance Use Topics     Alcohol use: No         Alcohol Use 11/14/2022   Prescreen: >3 drinks/day or >7 drinks/week? No   Prescreen: >3 drinks/day or >7 drinks/week? -         Breast Cancer Screening:    FHS-7: No flowsheet data " found.    Patient under 40 years of age: Routine Mammogram Screening not recommended.   Pertinent mammograms are reviewed under the imaging tab.    History of abnormal Pap smear:   Last 3 Pap and HPV Results:   PAP / HPV Latest Ref Rng & Units 5/25/2022 9/28/2021 10/1/2020   PAP   Low-grade squamous intraepithelial lesion (LSIL) encompassing HPV/mild dysplasia/CIN1(A) Negative for Intraepithelial Lesion or Malignancy (NILM) Negative for squamous intraepithelial lesion or malignancy  Electronically signed by Citlaly Doty CT (ASCP) on 10/9/2020 at 11:20 AM     HPV16 Negative Negative Negative Negative   HPV18 Negative Negative Negative Negative   HRHPV Negative Positive(A) Positive(A) Positive(A)     PAP / HPV Latest Ref Rng & Units 5/25/2022 9/28/2021 10/1/2020   PAP   Low-grade squamous intraepithelial lesion (LSIL) encompassing HPV/mild dysplasia/CIN1(A) Negative for Intraepithelial Lesion or Malignancy (NILM) Negative for squamous intraepithelial lesion or malignancy  Electronically signed by Citlaly Doty CT (ASCP) on 10/9/2020 at 11:20 AM     HPV16 Negative Negative Negative Negative   HPV18 Negative Negative Negative Negative   HRHPV Negative Positive(A) Positive(A) Positive(A)     Reviewed and updated as needed this visit by clinical staff   Tobacco  Allergies  Meds              Reviewed and updated as needed this visit by Provider                     Review of Systems  CONSTITUTIONAL: NEGATIVE for fever, chills, change in weight  RESP: NEGATIVE for significant cough or SOB  BREAST: NEGATIVE for masses, tenderness or discharge  CV: NEGATIVE for chest pain, palpitations or peripheral edema  GI: NEGATIVE for nausea, abdominal pain, heartburn, or change in bowel habits  : NEGATIVE for unusual urinary or vaginal symptoms. Periods are regular.     OBJECTIVE:   /65 (BP Location: Right arm, Patient Position: Sitting, Cuff Size: Adult Large)   Pulse 89   Temp 98.4  F (36.9  C) (Oral)   Resp 19  "  Ht 1.683 m (5' 6.25\")   Wt 96.7 kg (213 lb 1.9 oz)   SpO2 96%   BMI 34.14 kg/m    Physical Exam  GENERAL: healthy, alert and no distress  NECK: no adenopathy, no asymmetry, masses, or scars and thyroid normal to palpation  RESP: lungs clear to auscultation - no rales, rhonchi or wheezes  BREAST: normal without masses, tenderness or nipple discharge and no palpable axillary masses or adenopathy  CV: regular rate and rhythm, normal S1 S2, no S3 or S4, no murmur, click or rub, no peripheral edema and peripheral pulses strong  ABDOMEN: soft, nontender, no hepatosplenomegaly, no masses and bowel sounds normal   (female): deferred  MS: no gross musculoskeletal defects noted, no edema        ASSESSMENT/PLAN:   Annual physical exam      Mild persistent asthma, uncomplicated  Stable. No acute symptoms.     Attention deficit hyperactivity disorder (ADHD), other type  Not on med. Stable.    Borderline intellectual functioning  Lives alone, working.    Cervical high risk HPV (human papillomavirus) test positive  Repeat PAP due in 5/2023    Nexplanon in place            COUNSELING:  Reviewed preventive health counseling, as reflected in patient instructions       Regular exercise       Healthy diet/nutrition    Estimated body mass index is 34.14 kg/m  as calculated from the following:    Height as of this encounter: 1.683 m (5' 6.25\").    Weight as of this encounter: 96.7 kg (213 lb 1.9 oz).        She reports that she has never smoked. She has never used smokeless tobacco.      Counseling Resources:  ATP IV Guidelines  Pooled Cohorts Equation Calculator  Breast Cancer Risk Calculator  BRCA-Related Cancer Risk Assessment: FHS-7 Tool  FRAX Risk Assessment  ICSI Preventive Guidelines  Dietary Guidelines for Americans, 2010  USDA's MyPlate  ASA Prophylaxis  Lung CA Screening    Bandar Dean MD  [facility]  "

## 2023-05-08 ENCOUNTER — PATIENT OUTREACH (OUTPATIENT)
Dept: FAMILY MEDICINE | Facility: CLINIC | Age: 38
End: 2023-05-08
Payer: MEDICARE

## 2023-05-08 DIAGNOSIS — R87.810 CERVICAL HIGH RISK HPV (HUMAN PAPILLOMAVIRUS) TEST POSITIVE: ICD-10-CM

## 2023-05-29 ASSESSMENT — ASTHMA QUESTIONNAIRES
QUESTION_5 LAST FOUR WEEKS HOW WOULD YOU RATE YOUR ASTHMA CONTROL: COMPLETELY CONTROLLED
QUESTION_3 LAST FOUR WEEKS HOW OFTEN DID YOUR ASTHMA SYMPTOMS (WHEEZING, COUGHING, SHORTNESS OF BREATH, CHEST TIGHTNESS OR PAIN) WAKE YOU UP AT NIGHT OR EARLIER THAN USUAL IN THE MORNING: NOT AT ALL
ACT_TOTALSCORE: 25
QUESTION_4 LAST FOUR WEEKS HOW OFTEN HAVE YOU USED YOUR RESCUE INHALER OR NEBULIZER MEDICATION (SUCH AS ALBUTEROL): NOT AT ALL
QUESTION_1 LAST FOUR WEEKS HOW MUCH OF THE TIME DID YOUR ASTHMA KEEP YOU FROM GETTING AS MUCH DONE AT WORK, SCHOOL OR AT HOME: NONE OF THE TIME
ACT_TOTALSCORE: 25
QUESTION_2 LAST FOUR WEEKS HOW OFTEN HAVE YOU HAD SHORTNESS OF BREATH: NOT AT ALL

## 2023-05-30 ENCOUNTER — OFFICE VISIT (OUTPATIENT)
Dept: FAMILY MEDICINE | Facility: CLINIC | Age: 38
End: 2023-05-30
Payer: MEDICARE

## 2023-05-30 VITALS
WEIGHT: 217.5 LBS | DIASTOLIC BLOOD PRESSURE: 68 MMHG | SYSTOLIC BLOOD PRESSURE: 106 MMHG | RESPIRATION RATE: 16 BRPM | OXYGEN SATURATION: 97 % | BODY MASS INDEX: 34.96 KG/M2 | HEIGHT: 66 IN | TEMPERATURE: 97.3 F | HEART RATE: 84 BPM

## 2023-05-30 DIAGNOSIS — Z12.4 CERVICAL CANCER SCREENING: Primary | ICD-10-CM

## 2023-05-30 DIAGNOSIS — R87.612 LGSIL ON PAP SMEAR OF CERVIX: ICD-10-CM

## 2023-05-30 PROCEDURE — G0145 SCR C/V CYTO,THINLAYER,RESCR: HCPCS | Performed by: FAMILY MEDICINE

## 2023-05-30 PROCEDURE — 87624 HPV HI-RISK TYP POOLED RSLT: CPT | Performed by: FAMILY MEDICINE

## 2023-05-30 PROCEDURE — 99213 OFFICE O/P EST LOW 20 MIN: CPT | Performed by: FAMILY MEDICINE

## 2023-05-30 NOTE — PROGRESS NOTES
"  Cervical cancer screening  - Pap Screen with HPV - recommended age 30 - 65 years    LGSIL on Pap smear of cervix  Last pap in 5/22.  - Pap Screen with HPV - recommended age 30 - 65 years    Subjective   Sharon is a 37 year old, presenting for the following health issues:      Here for repeat pap.  LSIL with positive HPV in 5/2022.  Periods are regular. Currently not sexually active.  No other concerns.           5/30/2023    12:40 PM   Additional Questions   Roomed by Vijaya Gómez   Accompanied by Self     Review of Systems   CONSTITUTIONAL: NEGATIVE for fever, chills, change in weight  ENT/MOUTH: NEGATIVE for ear, mouth and throat problems  RESP: NEGATIVE for significant cough or SOB  CV: NEGATIVE for chest pain, palpitations or peripheral edema      Objective    /68 (BP Location: Left arm, Patient Position: Sitting, Cuff Size: Adult Regular)   Pulse 84   Temp 97.3  F (36.3  C) (Oral)   Resp 16   Ht 1.676 m (5' 6\")   Wt 98.7 kg (217 lb 8 oz)   LMP 04/26/2023 (Exact Date)   SpO2 97%   BMI 35.11 kg/m    Body mass index is 35.11 kg/m .  Physical Exam   GENERAL: healthy, alert and no distress  NECK: Supple.  ABDOMEN: soft, nontender, no hepatosplenomegaly, no masses and bowel sounds normal   (female): normal female external genitalia, normal urethral meatus, vaginal mucosa, normal cervix/adnexa/uterus without masses or discharge                "

## 2023-06-02 LAB
BKR LAB AP GYN ADEQUACY: NORMAL
BKR LAB AP GYN INTERPRETATION: NORMAL
BKR LAB AP HPV REFLEX: NORMAL
BKR LAB AP PREVIOUS ABNL DX: NORMAL
BKR LAB AP PREVIOUS ABNORMAL: NORMAL
PATH REPORT.COMMENTS IMP SPEC: NORMAL
PATH REPORT.COMMENTS IMP SPEC: NORMAL
PATH REPORT.RELEVANT HX SPEC: NORMAL

## 2023-06-05 LAB
HUMAN PAPILLOMA VIRUS 16 DNA: NEGATIVE
HUMAN PAPILLOMA VIRUS 18 DNA: NEGATIVE
HUMAN PAPILLOMA VIRUS FINAL DIAGNOSIS: NORMAL
HUMAN PAPILLOMA VIRUS OTHER HR: NEGATIVE

## 2023-09-27 DIAGNOSIS — F41.1 ANXIETY STATE: ICD-10-CM

## 2023-09-27 DIAGNOSIS — Z76.0 ENCOUNTER FOR MEDICATION REFILL: ICD-10-CM

## 2023-09-28 RX ORDER — FLUOXETINE 10 MG/1
CAPSULE ORAL
Qty: 90 CAPSULE | Refills: 0 | Status: SHIPPED | OUTPATIENT
Start: 2023-09-28 | End: 2023-11-14

## 2023-11-14 ENCOUNTER — OFFICE VISIT (OUTPATIENT)
Dept: FAMILY MEDICINE | Facility: CLINIC | Age: 38
End: 2023-11-14
Payer: MEDICARE

## 2023-11-14 VITALS
TEMPERATURE: 98.8 F | OXYGEN SATURATION: 95 % | RESPIRATION RATE: 18 BRPM | DIASTOLIC BLOOD PRESSURE: 77 MMHG | BODY MASS INDEX: 36.04 KG/M2 | HEART RATE: 77 BPM | HEIGHT: 66 IN | SYSTOLIC BLOOD PRESSURE: 110 MMHG | WEIGHT: 224.25 LBS

## 2023-11-14 DIAGNOSIS — R41.83 BORDERLINE INTELLECTUAL FUNCTIONING: ICD-10-CM

## 2023-11-14 DIAGNOSIS — Z00.00 ANNUAL PHYSICAL EXAM: Primary | ICD-10-CM

## 2023-11-14 DIAGNOSIS — Z00.00 LABORATORY EXAMINATION ORDERED AS PART OF A ROUTINE GENERAL MEDICAL EXAMINATION: ICD-10-CM

## 2023-11-14 DIAGNOSIS — Z13.220 SCREENING FOR LIPID DISORDERS: ICD-10-CM

## 2023-11-14 DIAGNOSIS — F41.1 ANXIETY STATE: ICD-10-CM

## 2023-11-14 DIAGNOSIS — J45.30 MILD PERSISTENT ASTHMA, UNCOMPLICATED: ICD-10-CM

## 2023-11-14 LAB
ALBUMIN SERPL BCG-MCNC: 4.2 G/DL (ref 3.5–5.2)
ALP SERPL-CCNC: 74 U/L (ref 40–150)
ALT SERPL W P-5'-P-CCNC: 22 U/L (ref 0–50)
ANION GAP SERPL CALCULATED.3IONS-SCNC: 10 MMOL/L (ref 7–15)
AST SERPL W P-5'-P-CCNC: 16 U/L (ref 0–45)
BILIRUB SERPL-MCNC: 0.2 MG/DL
BUN SERPL-MCNC: 18.9 MG/DL (ref 6–20)
CALCIUM SERPL-MCNC: 8.9 MG/DL (ref 8.6–10)
CHLORIDE SERPL-SCNC: 106 MMOL/L (ref 98–107)
CHOLEST SERPL-MCNC: 225 MG/DL
CREAT SERPL-MCNC: 0.57 MG/DL (ref 0.51–0.95)
DEPRECATED HCO3 PLAS-SCNC: 21 MMOL/L (ref 22–29)
EGFRCR SERPLBLD CKD-EPI 2021: >90 ML/MIN/1.73M2
GLUCOSE SERPL-MCNC: 128 MG/DL (ref 70–99)
HDLC SERPL-MCNC: 56 MG/DL
LDLC SERPL CALC-MCNC: 150 MG/DL
NONHDLC SERPL-MCNC: 169 MG/DL
POTASSIUM SERPL-SCNC: 4.2 MMOL/L (ref 3.4–5.3)
PROT SERPL-MCNC: 7.1 G/DL (ref 6.4–8.3)
SODIUM SERPL-SCNC: 137 MMOL/L (ref 135–145)
TRIGL SERPL-MCNC: 95 MG/DL

## 2023-11-14 PROCEDURE — 91320 SARSCV2 VAC 30MCG TRS-SUC IM: CPT | Performed by: FAMILY MEDICINE

## 2023-11-14 PROCEDURE — G0439 PPPS, SUBSEQ VISIT: HCPCS | Performed by: FAMILY MEDICINE

## 2023-11-14 PROCEDURE — 80053 COMPREHEN METABOLIC PANEL: CPT | Performed by: FAMILY MEDICINE

## 2023-11-14 PROCEDURE — 36415 COLL VENOUS BLD VENIPUNCTURE: CPT | Performed by: FAMILY MEDICINE

## 2023-11-14 PROCEDURE — 80061 LIPID PANEL: CPT | Performed by: FAMILY MEDICINE

## 2023-11-14 PROCEDURE — 90480 ADMN SARSCOV2 VAC 1/ONLY CMP: CPT | Performed by: FAMILY MEDICINE

## 2023-11-14 RX ORDER — FLUOXETINE 10 MG/1
10 CAPSULE ORAL DAILY
Qty: 90 CAPSULE | Refills: 3 | Status: SHIPPED | OUTPATIENT
Start: 2023-11-14 | End: 2024-08-15

## 2023-11-14 ASSESSMENT — ENCOUNTER SYMPTOMS
CHILLS: 0
DIZZINESS: 0
PALPITATIONS: 0
SHORTNESS OF BREATH: 0
NERVOUS/ANXIOUS: 0
COUGH: 0
HEMATOCHEZIA: 0
PARESTHESIAS: 0
JOINT SWELLING: 0
BREAST MASS: 0
WEAKNESS: 0
DIARRHEA: 0
HEARTBURN: 0
EYE PAIN: 0
HEADACHES: 0
MYALGIAS: 0
SORE THROAT: 0
FREQUENCY: 0
HEMATURIA: 0
NAUSEA: 0
FEVER: 0
ARTHRALGIAS: 0
CONSTIPATION: 0
DYSURIA: 0
ABDOMINAL PAIN: 0

## 2023-11-14 ASSESSMENT — ACTIVITIES OF DAILY LIVING (ADL): CURRENT_FUNCTION: NO ASSISTANCE NEEDED

## 2023-11-14 NOTE — PROGRESS NOTES
"SUBJECTIVE:   Sharon is a 38 year old who presents for Preventive Visit.      11/14/2023     9:29 AM   Additional Questions   Roomed by Adelina BRYANT     Are you in the first 12 months of your Medicare coverage?  No  Normal pap in 5/23.  On prozac for anxiety, stable. No SI/HI.    Healthy Habits:     In general, how would you rate your overall health?  Good    Frequency of exercise:  4-5 days/week    Duration of exercise:  15-30 minutes    Do you usually eat at least 4 servings of fruit and vegetables a day, include whole grains    & fiber and avoid regularly eating high fat or \"junk\" foods?  No    Taking medications regularly:  Yes    Medication side effects:  None    Ability to successfully perform activities of daily living:  No assistance needed    Home Safety:  No safety concerns identified    Hearing Impairment:  No hearing concerns    In the past 6 months, have you been bothered by leaking of urine?  No    In general, how would you rate your overall mental or emotional health?  Excellent    Additional concerns today:  No    Today's PHQ-2 Score:       11/14/2023     9:22 AM   PHQ-2 ( 1999 [employer])   Q1: Little interest or pleasure in doing things 0   Q2: Feeling down, depressed or hopeless 0   PHQ-2 Score 0   Q1: Little interest or pleasure in doing things Not at all   Q2: Feeling down, depressed or hopeless Not at all   PHQ-2 Score 0     Have you ever done Advance Care Planning? (For example, a Health Directive, POLST, or a discussion with a medical provider or your loved ones about your wishes): No, advance care planning information given to patient to review.  Patient plans to discuss their wishes with loved ones or provider.       Fall risk     Cognitive Screening   1) Repeat 3 items (Leader, Season, Table)    2) Clock draw: NORMAL  3) 3 item recall: Recalls 3 objects  Results: 3 items recalled: COGNITIVE IMPAIRMENT LESS LIKELY    Mini-CogTM Copyright S Mary. Licensed by the author for use in [employer] " Services; reprinted with permission (soob@[facility]). All rights reserved.          Reviewed and updated as needed this visit by clinical staff   Tobacco  Allergies  Meds              Reviewed and updated as needed this visit by Provider                 Social History     Tobacco Use    Smoking status: Never     Passive exposure: Never    Smokeless tobacco: Never   Substance Use Topics    Alcohol use: No             11/14/2023     9:22 AM   Alcohol Use   Prescreen: >3 drinks/day or >7 drinks/week? No     Do you have a current opioid prescription? No  Do you use any other controlled substances or medications that are not prescribed by a provider? None    Current providers sharing in care for this patient include:   Patient Care Team:  Liana Moyer MD as PCP - General (Family Practice)  Bandar Dean MD as Assigned PCP    The following health maintenance items are reviewed in Epic and correct as of today:  Health Maintenance   Topic Date Due    Pneumococcal Vaccine: Pediatrics (0 to 5 Years) and At-Risk Patients (6 to 64 Years) (1 - PCV) Never done    ANNUAL REVIEW OF HM ORDERS  05/25/2023    ASTHMA ACTION PLAN  07/11/2023    COVID-19 Vaccine (6 - 2023-24 season) 09/01/2023    MEDICARE ANNUAL WELLNESS VISIT  11/15/2023    ASTHMA CONTROL TEST  11/30/2023    PAP FOLLOW-UP  05/30/2026    HPV FOLLOW-UP  05/30/2026    ADVANCE CARE PLANNING  09/30/2026    DTAP/TDAP/TD IMMUNIZATION (8 - Td or Tdap) 12/05/2027    HEPATITIS C SCREENING  Completed    HIV SCREENING  Completed    PHQ-2 (once per calendar year)  Completed    INFLUENZA VACCINE  Completed    IPV IMMUNIZATION  Completed    HPV IMMUNIZATION  Completed    HEPATITIS B IMMUNIZATION  Completed    MENINGITIS IMMUNIZATION  Aged Out    RSV MONOCLONAL ANTIBODY  Aged Out    PAP  Discontinued     Lab work is in process         No data to display                Patient under 40 years of age: Routine Mammogram Screening not recommended.   Pertinent mammograms are reviewed under  "the imaging tab.    Review of Systems   Constitutional:  Negative for chills and fever.   HENT:  Negative for congestion, ear pain, hearing loss and sore throat.    Eyes:  Negative for pain and visual disturbance.   Respiratory:  Negative for cough and shortness of breath.    Cardiovascular:  Negative for chest pain, palpitations and peripheral edema.   Gastrointestinal:  Negative for abdominal pain, constipation, diarrhea, heartburn, hematochezia and nausea.   Breasts:  Negative for tenderness, breast mass and discharge.   Genitourinary:  Negative for dysuria, frequency, genital sores, hematuria, pelvic pain, urgency, vaginal bleeding and vaginal discharge.   Musculoskeletal:  Negative for arthralgias, joint swelling and myalgias.   Skin:  Negative for rash.   Neurological:  Negative for dizziness, weakness, headaches and paresthesias.   Psychiatric/Behavioral:  Negative for mood changes. The patient is not nervous/anxious.      CONSTITUTIONAL: NEGATIVE for fever, chills, change in weight  ENT/MOUTH: NEGATIVE for ear, mouth and throat problems  RESP: NEGATIVE for significant cough or SOB  CV: NEGATIVE for chest pain, palpitations or peripheral edema    OBJECTIVE:   There were no vitals taken for this visit. Estimated body mass index is 35.11 kg/m  as calculated from the following:    Height as of 5/30/23: 1.676 m (5' 6\").    Weight as of 5/30/23: 98.7 kg (217 lb 8 oz).  Vitals:    11/14/23 0933   BP: 110/77   Pulse: 77   Resp: 18   Temp: 98.8  F (37.1  C)   TempSrc: Oral   SpO2: 95%   Weight: 101.7 kg (224 lb 4 oz)   Height: 1.68 m (5' 6.14\")      Physical Exam  Gen - alert, orientated, NAD  Eyes - fundascopic exam limited by the undialated pupil but looks symmetric  ENT - oropharynx clear, TMs clear  Neck - supple, no palpable mass or lymphadenopathy  CV - RRR, no murmur  Breast - no dominant mass on either side, no axillary mass.  Resp - lungs CTA  Ab - soft, nontender, no palpable mass or organomegaly   - " "Declined. Not due for pap.  Extrem - warm, no edema  Neuro - CN II-XII intact, strength, sensation, reflexes intact and symmetric  Skin - no rash.  No atypical appearing lesions seen.        ASSESSMENT / PLAN:   Annual physical exam    Anxiety state  Stable  - FLUoxetine (PROZAC) 10 MG capsule; Take 1 capsule (10 mg) by mouth daily    Laboratory examination ordered as part of a routine general medical examination  - Comprehensive metabolic panel (BMP + Alb, Alk Phos, ALT, AST, Total. Bili, TP)    Screening for lipid disorders  - Lipid panel    Mild persistent asthma, uncomplicated  Stable    Borderline intellectual functioning  At baseline      COUNSELING:  Reviewed preventive health counseling, as reflected in patient instructions       Regular exercise       Healthy diet/nutrition      BMI:   Estimated body mass index is 35.11 kg/m  as calculated from the following:    Height as of 5/30/23: 1.676 m (5' 6\").    Weight as of 5/30/23: 98.7 kg (217 lb 8 oz).       She reports that she has never smoked. She has never been exposed to tobacco smoke. She has never used smokeless tobacco.      Appropriate preventive services were discussed with this patient, including applicable screening as appropriate for fall prevention, nutrition, physical activity, Tobacco-use cessation, weight loss and cognition.  Checklist reviewing preventive services available has been given to the patient.    Reviewed patients plan of care and provided an AVS. The  for Sharon meets the Care Plan requirement. This Care Plan has been established and reviewed with the .        Bandar Dean MD  [facility]    "

## 2024-06-07 ENCOUNTER — TELEPHONE (OUTPATIENT)
Dept: FAMILY MEDICINE | Facility: CLINIC | Age: 39
End: 2024-06-07
Payer: MEDICARE

## 2024-06-07 NOTE — TELEPHONE ENCOUNTER
Patient Quality Outreach    Patient is due for the following:   Asthma  -  ACT needed and Asthma follow-up visit    Next Steps:   Schedule a office visit for asthma follow up anytime now at your convenience and an Adult Preventative after 11/14/24.     Type of outreach:    Sent Popt[employer] message.      Questions for provider review:    None           Jessica Law MA

## 2024-08-10 SDOH — HEALTH STABILITY: PHYSICAL HEALTH: ON AVERAGE, HOW MANY MINUTES DO YOU ENGAGE IN EXERCISE AT THIS LEVEL?: 20 MIN

## 2024-08-10 SDOH — HEALTH STABILITY: PHYSICAL HEALTH: ON AVERAGE, HOW MANY DAYS PER WEEK DO YOU ENGAGE IN MODERATE TO STRENUOUS EXERCISE (LIKE A BRISK WALK)?: 2 DAYS

## 2024-08-10 ASSESSMENT — ASTHMA QUESTIONNAIRES
ACT_TOTALSCORE: 24
QUESTION_1 LAST FOUR WEEKS HOW MUCH OF THE TIME DID YOUR ASTHMA KEEP YOU FROM GETTING AS MUCH DONE AT WORK, SCHOOL OR AT HOME: NONE OF THE TIME
QUESTION_5 LAST FOUR WEEKS HOW WOULD YOU RATE YOUR ASTHMA CONTROL: WELL CONTROLLED
QUESTION_3 LAST FOUR WEEKS HOW OFTEN DID YOUR ASTHMA SYMPTOMS (WHEEZING, COUGHING, SHORTNESS OF BREATH, CHEST TIGHTNESS OR PAIN) WAKE YOU UP AT NIGHT OR EARLIER THAN USUAL IN THE MORNING: NOT AT ALL
QUESTION_2 LAST FOUR WEEKS HOW OFTEN HAVE YOU HAD SHORTNESS OF BREATH: NOT AT ALL
QUESTION_4 LAST FOUR WEEKS HOW OFTEN HAVE YOU USED YOUR RESCUE INHALER OR NEBULIZER MEDICATION (SUCH AS ALBUTEROL): NOT AT ALL

## 2024-08-10 ASSESSMENT — SOCIAL DETERMINANTS OF HEALTH (SDOH): HOW OFTEN DO YOU GET TOGETHER WITH FRIENDS OR RELATIVES?: TWICE A WEEK

## 2024-08-15 ENCOUNTER — OFFICE VISIT (OUTPATIENT)
Dept: FAMILY MEDICINE | Facility: CLINIC | Age: 39
End: 2024-08-15
Attending: FAMILY MEDICINE
Payer: MEDICARE

## 2024-08-15 ENCOUNTER — TELEPHONE (OUTPATIENT)
Dept: FAMILY MEDICINE | Facility: CLINIC | Age: 39
End: 2024-08-15

## 2024-08-15 VITALS
WEIGHT: 237 LBS | BODY MASS INDEX: 38.09 KG/M2 | HEIGHT: 66 IN | OXYGEN SATURATION: 97 % | TEMPERATURE: 97.5 F | HEART RATE: 84 BPM | DIASTOLIC BLOOD PRESSURE: 72 MMHG | SYSTOLIC BLOOD PRESSURE: 110 MMHG | RESPIRATION RATE: 16 BRPM

## 2024-08-15 DIAGNOSIS — Z13.220 SCREENING FOR LIPID DISORDERS: ICD-10-CM

## 2024-08-15 DIAGNOSIS — F79 UNSPECIFIED INTELLECTUAL DISABILITIES: ICD-10-CM

## 2024-08-15 DIAGNOSIS — F41.1 ANXIETY STATE: ICD-10-CM

## 2024-08-15 DIAGNOSIS — J45.20 MILD INTERMITTENT ASTHMA WITHOUT COMPLICATION: Primary | ICD-10-CM

## 2024-08-15 DIAGNOSIS — Z13.1 SCREENING FOR DIABETES MELLITUS: ICD-10-CM

## 2024-08-15 LAB
CHOLEST SERPL-MCNC: 206 MG/DL
FASTING STATUS PATIENT QL REPORTED: NO
HBA1C MFR BLD: 5.7 % (ref 0–5.6)
HDLC SERPL-MCNC: 48 MG/DL
LDLC SERPL CALC-MCNC: 139 MG/DL
NONHDLC SERPL-MCNC: 158 MG/DL
TRIGL SERPL-MCNC: 97 MG/DL

## 2024-08-15 PROCEDURE — G2211 COMPLEX E/M VISIT ADD ON: HCPCS | Performed by: FAMILY MEDICINE

## 2024-08-15 PROCEDURE — 83036 HEMOGLOBIN GLYCOSYLATED A1C: CPT | Performed by: FAMILY MEDICINE

## 2024-08-15 PROCEDURE — 80061 LIPID PANEL: CPT | Mod: GZ | Performed by: FAMILY MEDICINE

## 2024-08-15 PROCEDURE — 36415 COLL VENOUS BLD VENIPUNCTURE: CPT | Performed by: FAMILY MEDICINE

## 2024-08-15 PROCEDURE — 99214 OFFICE O/P EST MOD 30 MIN: CPT | Performed by: FAMILY MEDICINE

## 2024-08-15 RX ORDER — ALBUTEROL SULFATE 90 UG/1
AEROSOL, METERED RESPIRATORY (INHALATION)
Qty: 18 G | Refills: 0 | Status: SHIPPED | OUTPATIENT
Start: 2024-08-15 | End: 2024-09-29

## 2024-08-15 RX ORDER — FLUOXETINE 10 MG/1
10 CAPSULE ORAL DAILY
Qty: 90 CAPSULE | Refills: 3 | Status: SHIPPED | OUTPATIENT
Start: 2024-08-15

## 2024-08-15 ASSESSMENT — ASTHMA QUESTIONNAIRES: ACT_TOTALSCORE: 24

## 2024-08-15 NOTE — PROGRESS NOTES
"  Mild intermittent asthma without complication  Albuterol as needed. No daily med needed  - albuterol (PROAIR HFA/PROVENTIL HFA/VENTOLIN HFA) 108 (90 Base) MCG/ACT inhaler; [ALBUTEROL (PROAIR HFA;PROVENTIL HFA;VENTOLIN HFA) 90 MCG/ACTUATION INHALER] INHALE 2 PUFFS BY MOUTH FOUR TIMES DAILY AS NEEDED FOR WHEEZING WITH SPACER    Anxiety state  Stable  - FLUoxetine (PROZAC) 10 MG capsule; Take 1 capsule (10 mg) by mouth daily    Screening for lipid disorders  - Lipid panel    Screening for diabetes mellitus  - Hemoglobin A1c    Borderline Intellectual Functioning  At baseline.     Subjective   Sharon is a 39 year old female with history of intermittent asthma, anxiety, borderline intellectual functioning here to follow-up on asthma.  She takes Prozac 10 mg daily for anxiety, states it is working well.  He uses albuterol as needed for asthma, no daily inhaler.  No albuterol use in the past month.  Reports cough or wheezing only after strenuous exercise.  No acute cough or shortness of breath.          8/15/2024    10:55 AM   Additional Questions   Roomed by SANDRINE Swartz   Accompanied by self         Review of Systems  CONSTITUTIONAL: NEGATIVE for fever, chills, change in weight  ENT/MOUTH: NEGATIVE for ear, mouth and throat problems  RESP: NEGATIVE for significant cough or SOB  CV: NEGATIVE for chest pain/chest pressure      Objective    /72 (BP Location: Left arm, Patient Position: Sitting, Cuff Size: Adult Large)   Pulse 84   Temp 97.5  F (36.4  C) (Oral)   Resp 16   Ht 1.68 m (5' 6.14\")   Wt 107.5 kg (237 lb)   LMP 07/20/2024 (Exact Date)   SpO2 97%   BMI 38.09 kg/m    Body mass index is 38.09 kg/m .    Physical Exam   GENERAL: alert and no distress  NECK: Supple.  RESP: lungs clear to auscultation - no rales, rhonchi or wheezes  CV: regular rates and rhythm and no murmur, click or rub  ABDOMEN: soft, nontender, no hepatosplenomegaly, no masses and bowel sounds normal  PSYCH: mentation appears " normal    This transcription uses voice recognition software, which may contain typographical errors.    The longitudinal plan of care for the diagnosis(es)/condition(s) as documented were addressed during this visit. Due to the added complexity in care, I will continue to support Sharon in the subsequent management and with ongoing continuity of care.          Signed Electronically by: Bandar Dean MD    Answers submitted by the patient for this visit:  Asthma Visit Questionnaire (Submitted on 8/15/2024)  Chief Complaint: Chronic problems general questions HPI Form  Do you have a cough?: No  Are you experiencing any wheezing in your chest?: No  Do you have any shortness of breath?: No  Use of rescue inhaler:: never  Taking Asthma medication as prescribed:: 0  Asthma triggers:: cold air, exercise or sports, humidity, mold, pollens, smoke, upper respiratory infections  Have you had any Emergency Room visits, Urgent Care visits, or Hospital Admissions since your last office visit?: No  General Questionnaire (Submitted on 8/15/2024)  Chief Complaint: Chronic problems general questions HPI Form

## 2024-08-15 NOTE — TELEPHONE ENCOUNTER
Called pt and left message to call clinic back. If pt calls back, please relay message below. Thanks        Cj Garnica, BSN RN  [facility]

## 2024-08-15 NOTE — LETTER
My Asthma Action Plan    Name: Sharon Moraes   YOB: 1985  Date: 8/15/2024   My doctor: Bandar Dean MD   My clinic: [facility]        My Rescue Medicine:   Albuterol inhaler (Proair/Ventolin/Proventil HFA)  2-4 puffs EVERY 4 HOURS as needed. Use a spacer if recommended by your provider.   My Asthma Severity:   Intermittent / Exercise Induced  Know your asthma triggers: cold air             GREEN ZONE   Good Control  I feel good  No cough or wheeze  Can work, sleep and play without asthma symptoms       Take your asthma control medicine every day.     If exercise triggers your asthma, take your rescue medication  15 minutes before exercise or sports, and  During exercise if you have asthma symptoms  Spacer to use with inhaler: If you have a spacer, make sure to use it with your inhaler             YELLOW ZONE Getting Worse  I have ANY of these:  I do not feel good  Cough or wheeze  Chest feels tight  Wake up at night   Keep taking your Green Zone medications  Start taking your rescue medicine:  every 20 minutes for up to 1 hour. Then every 4 hours for 24-48 hours.  If you stay in the Yellow Zone for more than 12-24 hours, contact your doctor.  If you do not return to the Green Zone in 12-24 hours or you get worse, start taking your oral steroid medicine if prescribed by your provider.           RED ZONE Medical Alert - Get Help  I have ANY of these:  I feel awful  Medicine is not helping  Breathing getting harder  Trouble walking or talking  Nose opens wide to breathe       Take your rescue medicine NOW  If your provider has prescribed an oral steroid medicine, start taking it NOW  Call your doctor NOW  If you are still in the Red Zone after 20 minutes and you have not reached your doctor:  Take your rescue medicine again and  Call 911 or go to the emergency room right away    See your regular doctor within 2 weeks of an Emergency Room or Urgent Care visit for follow-up treatment.           Annual Reminders:  Meet with Asthma Educator,  Flu Shot in the Fall, consider Pneumonia Vaccination for patients with asthma (aged 19 and older).    [id number] [address] [facility]    Electronically signed by Bandar Dean MD   Date: 08/15/24                    Asthma Triggers  How To Control Things That Make Your Asthma Worse    Triggers are things that make your asthma worse.  Look at the list below to help you find your triggers and   what you can do about them. You can help prevent asthma flare-ups by staying away from your triggers.      Trigger                                                          What you can do   Cigarette Smoke  Tobacco smoke can make asthma worse. Do not allow smoking in your home, car or around you.  Be sure no one smokes at a child s day care or school.  If you smoke, ask your health care provider for ways to help you quit.  Ask family members to quit too.  Ask your health care provider for a referral to Quit Plan to help you quit smoking, or call 0-058-946-PLAN.     Colds, Flu, Bronchitis  These are common triggers of asthma. Wash your hands often.  Don t touch your eyes, nose or mouth.  Get a flu shot every year.     Dust Mites  These are tiny bugs that live in cloth or carpet. They are too small to see. Wash sheets and blankets in hot water every week.   Encase pillows and mattress in dust mite proof covers.  Avoid having carpet if you can. If you have carpet, vacuum weekly.   Use a dust mask and HEPA vacuum.   Pollen and Outdoor Mold  Some people are allergic to trees, grass, or weed pollen, or molds. Try to keep your windows closed.  Limit time out doors when pollen count is high.   Ask you health care provider about taking medicine during allergy season.     Animal Dander  Some people are allergic to skin flakes, urine or saliva from pets with fur or feathers. Keep pets with fur or feathers out of your  home.    If you can t keep the pet outdoors, then keep the pet out of your bedroom.  Keep the bedroom door closed.  Keep pets off cloth furniture and away from stuffed toys.     Mice, Rats, and Cockroaches  Some people are allergic to the waste from these pests.   Cover food and garbage.  Clean up spills and food crumbs.  Store grease in the refrigerator.   Keep food out of the bedroom.   Indoor Mold  This can be a trigger if your home has high moisture. Fix leaking faucets, pipes, or other sources of water.   Clean moldy surfaces.  Dehumidify basement if it is damp and smelly.   Smoke, Strong Odors, and Sprays  These can reduce air quality. Stay away from strong odors and sprays, such as perfume, powder, hair spray, paints, smoke incense, paint, cleaning products, candles and new carpet.   Exercise or Sports  Some people with asthma have this trigger. Be active!  Ask your doctor about taking medicine before sports or exercise to prevent symptoms.    Warm up for 5-10 minutes before and after sports or exercise.     Other Triggers of Asthma  Cold air:  Cover your nose and mouth with a scarf.  Sometimes laughing or crying can be a trigger.  Some medicines and food can trigger asthma.

## 2024-08-15 NOTE — TELEPHONE ENCOUNTER
----- Message from Bandar Dean sent at 8/15/2024 12:08 PM CDT -----  Diabetes test A1c is in prediabetes range, she does not need medication  but will recheck in a few months. Please call the patient.    Dr. Bandar Dean  8/15/2024 12:07 PM

## 2024-09-27 DIAGNOSIS — J45.20 MILD INTERMITTENT ASTHMA WITHOUT COMPLICATION: ICD-10-CM

## 2024-09-29 RX ORDER — ALBUTEROL SULFATE 90 UG/1
AEROSOL, METERED RESPIRATORY (INHALATION)
Qty: 18 G | Refills: 1 | Status: SHIPPED | OUTPATIENT
Start: 2024-09-29

## 2024-12-03 SDOH — HEALTH STABILITY: PHYSICAL HEALTH: ON AVERAGE, HOW MANY MINUTES DO YOU ENGAGE IN EXERCISE AT THIS LEVEL?: 20 MIN

## 2024-12-03 SDOH — HEALTH STABILITY: PHYSICAL HEALTH: ON AVERAGE, HOW MANY DAYS PER WEEK DO YOU ENGAGE IN MODERATE TO STRENUOUS EXERCISE (LIKE A BRISK WALK)?: 2 DAYS

## 2024-12-03 ASSESSMENT — ASTHMA QUESTIONNAIRES
QUESTION_2 LAST FOUR WEEKS HOW OFTEN HAVE YOU HAD SHORTNESS OF BREATH: NOT AT ALL
QUESTION_1 LAST FOUR WEEKS HOW MUCH OF THE TIME DID YOUR ASTHMA KEEP YOU FROM GETTING AS MUCH DONE AT WORK, SCHOOL OR AT HOME: NONE OF THE TIME
ACT_TOTALSCORE: 24
QUESTION_3 LAST FOUR WEEKS HOW OFTEN DID YOUR ASTHMA SYMPTOMS (WHEEZING, COUGHING, SHORTNESS OF BREATH, CHEST TIGHTNESS OR PAIN) WAKE YOU UP AT NIGHT OR EARLIER THAN USUAL IN THE MORNING: NOT AT ALL
QUESTION_4 LAST FOUR WEEKS HOW OFTEN HAVE YOU USED YOUR RESCUE INHALER OR NEBULIZER MEDICATION (SUCH AS ALBUTEROL): NOT AT ALL
QUESTION_5 LAST FOUR WEEKS HOW WOULD YOU RATE YOUR ASTHMA CONTROL: WELL CONTROLLED
ACT_TOTALSCORE: 24

## 2024-12-03 ASSESSMENT — SOCIAL DETERMINANTS OF HEALTH (SDOH): HOW OFTEN DO YOU GET TOGETHER WITH FRIENDS OR RELATIVES?: THREE TIMES A WEEK

## 2024-12-05 ENCOUNTER — OFFICE VISIT (OUTPATIENT)
Dept: FAMILY MEDICINE | Facility: CLINIC | Age: 39
End: 2024-12-05
Payer: MEDICARE

## 2024-12-05 VITALS
SYSTOLIC BLOOD PRESSURE: 108 MMHG | HEART RATE: 100 BPM | TEMPERATURE: 98.1 F | HEIGHT: 66 IN | WEIGHT: 238 LBS | BODY MASS INDEX: 38.25 KG/M2 | RESPIRATION RATE: 20 BRPM | DIASTOLIC BLOOD PRESSURE: 70 MMHG | OXYGEN SATURATION: 98 %

## 2024-12-05 DIAGNOSIS — E66.01 CLASS 2 SEVERE OBESITY DUE TO EXCESS CALORIES WITH SERIOUS COMORBIDITY AND BODY MASS INDEX (BMI) OF 38.0 TO 38.9 IN ADULT (H): ICD-10-CM

## 2024-12-05 DIAGNOSIS — R73.03 PREDIABETES: ICD-10-CM

## 2024-12-05 DIAGNOSIS — J45.20 MILD INTERMITTENT ASTHMA WITHOUT COMPLICATION: ICD-10-CM

## 2024-12-05 DIAGNOSIS — R41.83 BORDERLINE INTELLECTUAL FUNCTIONING: ICD-10-CM

## 2024-12-05 DIAGNOSIS — R87.612 LGSIL ON PAP SMEAR OF CERVIX: ICD-10-CM

## 2024-12-05 DIAGNOSIS — Z00.00 ENCOUNTER FOR MEDICARE ANNUAL WELLNESS EXAM: Primary | ICD-10-CM

## 2024-12-05 DIAGNOSIS — E66.812 CLASS 2 SEVERE OBESITY DUE TO EXCESS CALORIES WITH SERIOUS COMORBIDITY AND BODY MASS INDEX (BMI) OF 38.0 TO 38.9 IN ADULT (H): ICD-10-CM

## 2024-12-05 DIAGNOSIS — Z23 NEED FOR VACCINATION: ICD-10-CM

## 2024-12-05 NOTE — PROGRESS NOTES
"Preventive Care Visit  [facility] ANDREY Dawkins MD, Family Medicine  Dec 5, 2024      Assessment & Plan     Encounter for Medicare annual wellness exam  Labs, screenings, and vaccines as ordered and counseling as detailed below.    Prediabetes  A1c was 5.7% over the summer, she prefers to wait until next appointment to recheck. Weight loss will help manage prediabetes.    Mild intermittent asthma without complication  Symptoms well controlled with as needed albuterol, which she uses infrequently. Continue current meds.    Borderline intellectual functioning  Noted    Need for vaccination  States she got flu and covid shots in the fall.  - Pneumococcal 20 Valent Conjugate (Prevnar 20)    LGSIL on Pap smear of cervix  Next pap due 2026.    Class 2 severe obesity due to excess calories with serious comorbidity and body mass index (BMI) of 38.0 to 38.9 in adult (H)  Discussed diet and exercise recommendations.    Patient has been advised of split billing requirements and indicates understanding: Yes        BMI  Estimated body mass index is 38.12 kg/m  as calculated from the following:    Height as of this encounter: 1.683 m (5' 6.25\").    Weight as of this encounter: 108 kg (238 lb).   Weight management plan: Discussed healthy diet and exercise guidelines    Counseling  Appropriate preventive services were addressed with this patient via screening, questionnaire, or discussion as appropriate for fall prevention, nutrition, physical activity, Tobacco-use cessation, social engagement, weight loss and cognition.  Checklist reviewing preventive services available has been given to the patient.  Reviewed patient's diet, addressing concerns and/or questions.   She is at risk for lack of exercise and has been provided with information to increase physical activity for the benefit of her well-being.       Follow up 6mos with PCP for asthma, anxiety, obesity/prediabetes    Subjective   Sharon " is a 39 year old, presenting for the following:  Physical        12/5/2024    11:29 AM   Additional Questions   Roomed by ESHTELA GOMEZ CMA   Accompanied by NONE           HPI  Trying to work on weight  Drinking less pop  Walks for exercise    Sister age 45 and brother age 43    Got flu and covid shots this fall              5/29/2023     2:52 PM 8/10/2024     4:57 PM 12/3/2024     9:27 AM   ACT Total Scores   ACT TOTAL SCORE (Goal Greater than or Equal to 20) 25 24 24    In the past 12 months, how many times did you visit the emergency room for your asthma without being admitted to the hospital? 0  0  0    In the past 12 months, how many times were you hospitalized overnight because of your asthma? 0  0  0        Patient-reported           Health Care Directive  Patient does not have a Health Care Directive: not discussed      12/3/2024   General Health   How would you rate your overall physical health? Excellent   Feel stress (tense, anxious, or unable to sleep) Only a little      (!) STRESS CONCERN      12/3/2024   Nutrition   Diet: Regular (no restrictions)            12/3/2024   Exercise   Days per week of moderate/strenous exercise 2 days   Average minutes spent exercising at this level 20 min      (!) EXERCISE CONCERN      12/3/2024   Social Factors   Frequency of gathering with friends or relatives Three times a week   Worry food won't last until get money to buy more No   Food not last or not have enough money for food? No   Do you have housing? (Housing is defined as stable permanent housing and does not include staying ouside in a car, in a tent, in an abandoned building, in an overnight shelter, or couch-surfing.) No   Are you worried about losing your housing? No   Lack of transportation? No   Unable to get utilities (heat,electricity)? No   Want help with housing or utility concern? No      (!) HOUSING CONCERN PRESENT      12/3/2024   Fall Risk   Fallen 2 or more times in the past year? No    Trouble with  walking or balance? No        Patient-reported          12/3/2024   Activities of Daily Living- Home Safety   Needs help with the following daily activites None of the above   Safety concerns in the home None of the above            12/3/2024   Dental   Dentist two times every year? Yes            12/3/2024   Hearing Screening   Hearing concerns? None of the above            12/3/2024   Driving Risk Screening   Patient/family members have concerns about driving No            12/3/2024   General Alertness/Fatigue Screening   Have you been more tired than usual lately? No            12/3/2024   Urinary Incontinence Screening   Bothered by leaking urine in past 6 months No            8/10/2024   TB Screening   Were you born outside of the [place]? No            Today's PHQ-2 Score:       12/5/2024    11:19 AM   PHQ-2 ( 1999 [employer])   Q1: Little interest or pleasure in doing things 0    Q2: Feeling down, depressed or hopeless 0    PHQ-2 Score 0    Q1: Little interest or pleasure in doing things Not at all   Q2: Feeling down, depressed or hopeless Not at all   PHQ-2 Score 0       Patient-reported           12/3/2024   Substance Use   Alcohol more than 3/day or more than 7/wk No   Do you have a current opioid prescription? No   How severe/bad is pain from 1 to 10? 0/10 (No Pain)   Do you use any other substances recreationally? No        Social History     Tobacco Use    Smoking status: Never     Passive exposure: Never    Smokeless tobacco: Never   Vaping Use    Vaping status: Never Used   Substance Use Topics    Alcohol use: No    Drug use: No          Mammogram Screening - Patient under 40 years of age: Routine Mammogram Screening not recommended.       History of abnormal Pap smear: YES - reflected in Problem List and Health Maintenance accordingly        Latest Ref Rng & Units 5/30/2023     1:01 PM 5/25/2022     1:09 PM 9/28/2021     2:04 PM   PAP / HPV   PAP  Negative for Intraepithelial Lesion or Malignancy (NILM)   Low-grade squamous intraepithelial lesion (LSIL) encompassing HPV/mild dysplasia/CIN1  Negative for Intraepithelial Lesion or Malignancy (NILM)    HPV 16 DNA Negative Negative  Negative  Negative    HPV 18 DNA Negative Negative  Negative  Negative    Other HR HPV Negative Negative  Positive  Positive            12/3/2024   Contraception/Family Planning   Questions about contraception or family planning No            Reviewed and updated as needed this visit by Provider                      Current providers sharing in care for this patient include:  Patient Care Team:  Bandar Dean MD as PCP - General (Family Medicine)  Bandar Dean MD as Assigned PCP    The following health maintenance items are reviewed in Epic and correct as of today:  Health Maintenance   Topic Date Due    Pneumococcal Vaccine: Pediatrics (0 to 5 Years) and At-Risk Patients (6 to 64 Years) (1 of 2 - PCV) Never done    INFLUENZA VACCINE (1) 09/01/2024    COVID-19 Vaccine (7 - 2024-25 season) 09/01/2024    ANNUAL REVIEW OF HM ORDERS  11/14/2024    MEDICARE ANNUAL WELLNESS VISIT  11/14/2024    ASTHMA CONTROL TEST  06/05/2025    ASTHMA ACTION PLAN  08/15/2025    PAP FOLLOW-UP  05/30/2026    HPV FOLLOW-UP  05/30/2026    GLUCOSE  11/14/2026    DTAP/TDAP/TD IMMUNIZATION (8 - Td or Tdap) 12/05/2027    ADVANCE CARE PLANNING  11/14/2028    RSV VACCINE (1 - 1-dose 75+ series) 07/18/2062    HEPATITIS C SCREENING  Completed    HIV SCREENING  Completed    PHQ-2 (once per calendar year)  Completed    HPV IMMUNIZATION  Completed    HEPATITIS B IMMUNIZATION  Completed    MENINGITIS IMMUNIZATION  Aged Out    RSV MONOCLONAL ANTIBODY  Aged Out    PAP  Discontinued         Review of Systems  Constitutional, HEENT, cardiovascular, pulmonary, gi and gu systems are negative, except as otherwise noted.     Objective    Exam  /70 (BP Location: Left arm, Patient Position: Sitting, Cuff Size: Adult Regular)   Pulse 100   Temp 98.1  F (36.7  C) (Oral)   Resp 20   Ht  "1.683 m (5' 6.25\")   Wt 108 kg (238 lb)   LMP 11/29/2024   SpO2 98%   BMI 38.12 kg/m     Estimated body mass index is 38.12 kg/m  as calculated from the following:    Height as of this encounter: 1.683 m (5' 6.25\").    Weight as of this encounter: 108 kg (238 lb).  Wt Readings from Last 3 Encounters:   12/05/24 108 kg (238 lb)   08/15/24 107.5 kg (237 lb)   11/14/23 101.7 kg (224 lb 4 oz)       Physical Exam          12/5/2024   Mini Cog   Clock Draw Score 2 Normal   3 Item Recall 3 objects recalled   Mini Cog Total Score 5             Prior to immunization administration, verified patients identity using patient s name and date of birth. Please see Immunization Activity for additional information.     Screening Questionnaire for Adult Immunization    Are you sick today?   No   Do you have allergies to medications, food, a vaccine component or latex?   No   Have you ever had a serious reaction after receiving a vaccination?   No   Do you have a long-term health problem with heart, lung, kidney, or metabolic disease (e.g., diabetes), asthma, a blood disorder, no spleen, complement component deficiency, a cochlear implant, or a spinal fluid leak?  Are you on long-term aspirin therapy?   No   Do you have cancer, leukemia, HIV/AIDS, or any other immune system problem?   No   Do you have a parent, brother, or sister with an immune system problem?   No   In the past 3 months, have you taken medications that affect  your immune system, such as prednisone, other steroids, or anticancer drugs; drugs for the treatment of rheumatoid arthritis, Crohn s disease, or psoriasis; or have you had radiation treatments?   No   Have you had a seizure, or a brain or other nervous system problem?   No   During the past year, have you received a transfusion of blood or blood    products, or been given immune (gamma) globulin or antiviral drug?   No   For women: Are you pregnant or is there a chance you could become       pregnant " during the next month?   No   Have you received any vaccinations in the past 4 weeks?   No     Immunization questionnaire answers were all negative.      Patient instructed to remain in clinic for 15 minutes afterwards, and to report any adverse reactions.     Screening performed by Yvan Corona MA on 12/5/2024 at 12:07 PM.         Signed Electronically by: Norma Dawkins MD

## 2024-12-05 NOTE — PATIENT INSTRUCTIONS
Patient Education   Preventive Care Advice   This is general advice given by our system to help you stay healthy. However, your care team may have specific advice just for you. Please talk to your care team about your preventive care needs.  Nutrition  Eat 5 or more servings of fruits and vegetables each day.  Try wheat bread, brown rice and whole grain pasta (instead of white bread, rice, and pasta).  Get enough calcium and vitamin D. Check the label on foods and aim for 100% of the RDA (recommended daily allowance).  Lifestyle  Exercise at least 150 minutes each week  (30 minutes a day, 5 days a week).  Do muscle strengthening activities 2 days a week. These help control your weight and prevent disease.  No smoking.  Wear sunscreen to prevent skin cancer.  Have a dental exam and cleaning every 6 months.  Yearly exams  See your health care team every year to talk about:  Any changes in your health.  Any medicines your care team has prescribed.  Preventive care, family planning, and ways to prevent chronic diseases.  Shots (vaccines)   HPV shots (up to age 26), if you've never had them before.  Hepatitis B shots (up to age 59), if you've never had them before.  COVID-19 shot: Get this shot when it's due.  Flu shot: Get a flu shot every year.  Tetanus shot: Get a tetanus shot every 10 years.  Pneumococcal, hepatitis A, and RSV shots: Ask your care team if you need these based on your risk.  Shingles shot (for age 50 and up)  General health tests  Diabetes screening:  Starting at age 35, Get screened for diabetes at least every 3 years.  If you are younger than age 35, ask your care team if you should be screened for diabetes.  Cholesterol test: At age 39, start having a cholesterol test every 5 years, or more often if advised.  Bone density scan (DEXA): At age 50, ask your care team if you should have this scan for osteoporosis (brittle bones).  Hepatitis C: Get tested at least once in your life.  STIs (sexually  transmitted infections)  Before age 24: Ask your care team if you should be screened for STIs.  After age 24: Get screened for STIs if you're at risk. You are at risk for STIs (including HIV) if:  You are sexually active with more than one person.  You don't use condoms every time.  You or a partner was diagnosed with a sexually transmitted infection.  If you are at risk for HIV, ask about PrEP medicine to prevent HIV.  Get tested for HIV at least once in your life, whether you are at risk for HIV or not.  Cancer screening tests  Cervical cancer screening: If you have a cervix, begin getting regular cervical cancer screening tests starting at age 21.  Breast cancer scan (mammogram): If you've ever had breasts, begin having regular mammograms starting at age 40. This is a scan to check for breast cancer.  Colon cancer screening: It is important to start screening for colon cancer at age 45.  Have a colonoscopy test every 10 years (or more often if you're at risk) Or, ask your provider about stool tests like a FIT test every year or Cologuard test every 3 years.  To learn more about your testing options, visit:   .  For help making a decision, visit:   https://bit.ly/ss53449.  Prostate cancer screening test: If you have a prostate, ask your care team if a prostate cancer screening test (PSA) at age 55 is right for you.  Lung cancer screening: If you are a current or former smoker ages 50 to 80, ask your care team if ongoing lung cancer screenings are right for you.  For informational purposes only. Not to replace the advice of your health care provider. Copyright   2023 [employer]. All rights reserved. Clinically reviewed by the [facility]. [place] 147114 - REV 01/24.

## 2025-06-05 ENCOUNTER — OFFICE VISIT (OUTPATIENT)
Dept: FAMILY MEDICINE | Facility: CLINIC | Age: 40
End: 2025-06-05
Payer: MEDICARE

## 2025-06-05 ENCOUNTER — RESULTS FOLLOW-UP (OUTPATIENT)
Dept: FAMILY MEDICINE | Facility: CLINIC | Age: 40
End: 2025-06-05

## 2025-06-05 VITALS
WEIGHT: 241.06 LBS | BODY MASS INDEX: 38.74 KG/M2 | HEIGHT: 66 IN | DIASTOLIC BLOOD PRESSURE: 64 MMHG | SYSTOLIC BLOOD PRESSURE: 100 MMHG | OXYGEN SATURATION: 96 % | RESPIRATION RATE: 20 BRPM | TEMPERATURE: 98.4 F | HEART RATE: 75 BPM

## 2025-06-05 DIAGNOSIS — Z97.5 NEXPLANON IN PLACE: ICD-10-CM

## 2025-06-05 DIAGNOSIS — R41.83 BORDERLINE INTELLECTUAL FUNCTIONING: ICD-10-CM

## 2025-06-05 DIAGNOSIS — E78.2 MIXED HYPERLIPIDEMIA: ICD-10-CM

## 2025-06-05 DIAGNOSIS — E66.812 CLASS 2 SEVERE OBESITY DUE TO EXCESS CALORIES WITH SERIOUS COMORBIDITY AND BODY MASS INDEX (BMI) OF 38.0 TO 38.9 IN ADULT (H): ICD-10-CM

## 2025-06-05 DIAGNOSIS — E66.01 CLASS 2 SEVERE OBESITY DUE TO EXCESS CALORIES WITH SERIOUS COMORBIDITY AND BODY MASS INDEX (BMI) OF 38.0 TO 38.9 IN ADULT (H): ICD-10-CM

## 2025-06-05 DIAGNOSIS — F41.1 ANXIETY STATE: ICD-10-CM

## 2025-06-05 DIAGNOSIS — R73.03 PREDIABETES: ICD-10-CM

## 2025-06-05 DIAGNOSIS — J45.20 MILD INTERMITTENT ASTHMA WITHOUT COMPLICATION: Primary | ICD-10-CM

## 2025-06-05 LAB
ANION GAP SERPL CALCULATED.3IONS-SCNC: 12 MMOL/L (ref 7–15)
BUN SERPL-MCNC: 16.9 MG/DL (ref 6–20)
CALCIUM SERPL-MCNC: 9.6 MG/DL (ref 8.8–10.4)
CHLORIDE SERPL-SCNC: 106 MMOL/L (ref 98–107)
CHOLEST SERPL-MCNC: 213 MG/DL
CREAT SERPL-MCNC: 0.63 MG/DL (ref 0.51–0.95)
EGFRCR SERPLBLD CKD-EPI 2021: >90 ML/MIN/1.73M2
EST. AVERAGE GLUCOSE BLD GHB EST-MCNC: 117 MG/DL
FASTING STATUS PATIENT QL REPORTED: YES
FASTING STATUS PATIENT QL REPORTED: YES
GLUCOSE SERPL-MCNC: 118 MG/DL (ref 70–99)
HBA1C MFR BLD: 5.7 % (ref 0–5.6)
HCO3 SERPL-SCNC: 20 MMOL/L (ref 22–29)
HDLC SERPL-MCNC: 44 MG/DL
LDLC SERPL CALC-MCNC: 154 MG/DL
NONHDLC SERPL-MCNC: 169 MG/DL
POTASSIUM SERPL-SCNC: 4.4 MMOL/L (ref 3.4–5.3)
SODIUM SERPL-SCNC: 138 MMOL/L (ref 135–145)
TRIGL SERPL-MCNC: 75 MG/DL

## 2025-06-05 RX ORDER — FLUOXETINE 10 MG/1
10 CAPSULE ORAL DAILY
Qty: 90 CAPSULE | Refills: 3 | Status: SHIPPED | OUTPATIENT
Start: 2025-06-05

## 2025-06-05 RX ORDER — ALBUTEROL SULFATE 90 UG/1
INHALANT RESPIRATORY (INHALATION)
Qty: 18 G | Refills: 1 | Status: SHIPPED | OUTPATIENT
Start: 2025-06-05

## 2025-06-05 ASSESSMENT — ASTHMA QUESTIONNAIRES
ACT_TOTALSCORE: 22
QUESTION_1 LAST FOUR WEEKS HOW MUCH OF THE TIME DID YOUR ASTHMA KEEP YOU FROM GETTING AS MUCH DONE AT WORK, SCHOOL OR AT HOME: A LITTLE OF THE TIME
QUESTION_5 LAST FOUR WEEKS HOW WOULD YOU RATE YOUR ASTHMA CONTROL: WELL CONTROLLED
QUESTION_2 LAST FOUR WEEKS HOW OFTEN HAVE YOU HAD SHORTNESS OF BREATH: ONCE OR TWICE A WEEK
QUESTION_4 LAST FOUR WEEKS HOW OFTEN HAVE YOU USED YOUR RESCUE INHALER OR NEBULIZER MEDICATION (SUCH AS ALBUTEROL): NOT AT ALL
QUESTION_3 LAST FOUR WEEKS HOW OFTEN DID YOUR ASTHMA SYMPTOMS (WHEEZING, COUGHING, SHORTNESS OF BREATH, CHEST TIGHTNESS OR PAIN) WAKE YOU UP AT NIGHT OR EARLIER THAN USUAL IN THE MORNING: NOT AT ALL

## 2025-06-05 NOTE — PROGRESS NOTES
Mild intermittent asthma without complication  Stable.  Continue albuterol as needed.  - albuterol (PROAIR HFA/PROVENTIL HFA/VENTOLIN HFA) 108 (90 Base) MCG/ACT inhaler; INHALE 2 PUFFS BY MOUTH FOUR TIMES DAILY AS NEEDED FOR WHEEZING WITH SPACER    Prediabetes  A1c 5.7 today.  Will continue to monitor.  - Hemoglobin A1c  - Basic metabolic panel    Nexplanon in place  She will schedule appointment for Nexplanon removal    Anxiety state  Stable.  - FLUoxetine (PROZAC) 10 MG capsule; Take 1 capsule (10 mg) by mouth daily.    Borderline intellectual functioning  At baseline.    Class 2 severe obesity due to excess calories with serious comorbidity and body mass index (BMI) of 38.0 to 38.9 in adult (H)  The following high BMI interventions were performed this visit: encouragement to exercise, dietary management education, guidance, and counseling, and  lifestyle education regarding diet     Mixed hyperlipidemia  - Lipid panel reflex to direct LDL Non-fasting     Subjective   Sharon is a 39 year old woman with intermittent asthma, prediabetes, hyperlipidemia, borderline intellectual functioning and history of obesity here for follow-up.  She states she uses albuterol as needed, no albuterol use in the past 2 weeks.  No acute cough, shortness of breath or wheezing.  She takes Prozac 10 mg daily, it is helping for her anxiety.  She is not on lipid medication currently.  She states she has been trying to lose weight, trying to exercise as much as she can.  No new concerns since last visit.        6/5/2025    10:00 AM   Additional Questions   Roomed by Adelina vallejo   Accompanied by self         Review of Systems  CONSTITUTIONAL: NEGATIVE for fever, chills, change in weight  ENT/MOUTH: NEGATIVE for ear, mouth and throat problems  RESP: NEGATIVE for significant cough or SOB  CV: NEGATIVE for chest pain/chest pressure      Objective    LMP 05/27/2025 (Exact Date)     Vitals:    06/05/25 1004   BP: 100/64   Pulse: 75   Resp: 20  "  Temp: 98.4  F (36.9  C)   TempSrc: Oral   SpO2: 96%   Weight: 109.3 kg (241 lb 1 oz)   Height: 1.683 m (5' 6.25\")      Physical Exam   GENERAL: alert and no distress  NECK: Supple  RESP: lungs clear to auscultation - no rales, rhonchi or wheezes  CV: regular rates and rhythm and no murmur, click or rub  ABDOMEN: soft, nontender  MS: no gross musculoskeletal defects noted, no edema  PSYCH: mentation appears normal    This transcription uses voice recognition software, which may contain typographical errors.    The longitudinal plan of care for the diagnosis(es)/condition(s) as documented were addressed during this visit. Due to the added complexity in care, I will continue to support Sharon in the subsequent management and with ongoing continuity of care.          Signed Electronically by: Bandar Dean MD    "

## 2025-08-21 ENCOUNTER — OFFICE VISIT (OUTPATIENT)
Dept: FAMILY MEDICINE | Facility: CLINIC | Age: 40
End: 2025-08-21
Payer: MEDICARE

## 2025-08-21 VITALS
OXYGEN SATURATION: 95 % | RESPIRATION RATE: 20 BRPM | DIASTOLIC BLOOD PRESSURE: 73 MMHG | TEMPERATURE: 97.7 F | HEIGHT: 66 IN | WEIGHT: 235 LBS | HEART RATE: 97 BPM | BODY MASS INDEX: 37.77 KG/M2 | SYSTOLIC BLOOD PRESSURE: 108 MMHG

## 2025-08-21 DIAGNOSIS — Z30.46 SURVEILLANCE OF PREVIOUSLY PRESCRIBED IMPLANTABLE SUBDERMAL CONTRACEPTIVE: ICD-10-CM

## 2025-08-21 DIAGNOSIS — L73.9 FOLLICULITIS: Primary | ICD-10-CM

## 2025-08-21 RX ORDER — CEPHALEXIN 500 MG/1
500 CAPSULE ORAL 2 TIMES DAILY
Qty: 14 CAPSULE | Refills: 0 | Status: SHIPPED | OUTPATIENT
Start: 2025-08-21 | End: 2025-08-28

## 2025-08-21 RX ORDER — TRIAMCINOLONE ACETONIDE 1 MG/G
CREAM TOPICAL 2 TIMES DAILY
Qty: 80 G | Refills: 5 | Status: SHIPPED | OUTPATIENT
Start: 2025-08-21